# Patient Record
Sex: MALE | Race: WHITE | Employment: PART TIME | URBAN - METROPOLITAN AREA
[De-identification: names, ages, dates, MRNs, and addresses within clinical notes are randomized per-mention and may not be internally consistent; named-entity substitution may affect disease eponyms.]

---

## 2019-07-10 ENCOUNTER — HOSPITAL ENCOUNTER (OUTPATIENT)
Age: 54
Setting detail: SPECIMEN
Discharge: HOME | End: 2019-07-10
Payer: MEDICAID

## 2019-07-10 LAB
ALBUMIN SERPL-MCNC: 4.2 G/DL (ref 3.5–5.2)
ALBUMIN/GLOBULIN RATIO: 1.3 (ref 1–2.5)
ALP BLD-CCNC: 70 U/L (ref 40–129)
ALT SERPL-CCNC: 17 U/L (ref 5–41)
ANION GAP SERPL CALCULATED.3IONS-SCNC: 15 MMOL/L (ref 9–17)
AST SERPL-CCNC: 17 U/L
BILIRUB SERPL-MCNC: 0.34 MG/DL (ref 0.3–1.2)
BUN BLDV-MCNC: 12 MG/DL (ref 6–20)
BUN/CREAT BLD: NORMAL (ref 9–20)
CALCIUM SERPL-MCNC: 9.4 MG/DL (ref 8.6–10.4)
CHLORIDE BLD-SCNC: 102 MMOL/L (ref 98–107)
CHOLESTEROL, FASTING: 230 MG/DL
CHOLESTEROL/HDL RATIO: 3.4
CO2: 23 MMOL/L (ref 20–31)
CREAT SERPL-MCNC: 0.79 MG/DL (ref 0.7–1.2)
GFR AFRICAN AMERICAN: >60 ML/MIN
GFR NON-AFRICAN AMERICAN: >60 ML/MIN
GFR SERPL CREATININE-BSD FRML MDRD: NORMAL ML/MIN/{1.73_M2}
GFR SERPL CREATININE-BSD FRML MDRD: NORMAL ML/MIN/{1.73_M2}
GLUCOSE BLD-MCNC: 77 MG/DL (ref 70–99)
HAV IGM SER IA-ACNC: NONREACTIVE
HDLC SERPL-MCNC: 68 MG/DL
HEPATITIS B CORE IGM ANTIBODY: NONREACTIVE
HEPATITIS B SURFACE ANTIGEN: NONREACTIVE
HEPATITIS C ANTIBODY: NONREACTIVE
HIV AG/AB: NONREACTIVE
LDL CHOLESTEROL: 143 MG/DL (ref 0–130)
POTASSIUM SERPL-SCNC: 4.4 MMOL/L (ref 3.7–5.3)
PROSTATE SPECIFIC ANTIGEN: 3.14 UG/L
SODIUM BLD-SCNC: 140 MMOL/L (ref 135–144)
TESTOSTERONE TOTAL: 364 NG/DL (ref 220–1000)
TOTAL PROTEIN: 7.4 G/DL (ref 6.4–8.3)
TRIGLYCERIDE, FASTING: 96 MG/DL
VLDLC SERPL CALC-MCNC: ABNORMAL MG/DL (ref 1–30)

## 2019-07-11 LAB
THYROXINE, FREE: 1.07 NG/DL (ref 0.93–1.7)
TSH SERPL DL<=0.05 MIU/L-ACNC: 6.29 MIU/L (ref 0.3–5)

## 2019-07-12 LAB
CULTURE: ABNORMAL
CULTURE: ABNORMAL
DIRECT EXAM: ABNORMAL
Lab: ABNORMAL
SPECIMEN DESCRIPTION: ABNORMAL

## 2019-07-16 LAB
CULTURE: NORMAL
Lab: NORMAL
SPECIMEN DESCRIPTION: NORMAL

## 2020-01-01 ENCOUNTER — HOSPITAL ENCOUNTER (OUTPATIENT)
Age: 55
Setting detail: SPECIMEN
Discharge: HOME | End: 2020-08-31
Payer: MEDICAID

## 2020-01-01 ENCOUNTER — HOSPITAL ENCOUNTER (OUTPATIENT)
Age: 55
Setting detail: SPECIMEN
Discharge: HOME | End: 2020-11-24
Payer: MEDICAID

## 2020-01-01 LAB
ABSOLUTE EOS #: 0.24 K/UL (ref 0–0.44)
ABSOLUTE EOS #: 0.44 K/UL (ref 0–0.44)
ABSOLUTE IMMATURE GRANULOCYTE: 0.04 K/UL (ref 0–0.3)
ABSOLUTE IMMATURE GRANULOCYTE: <0.03 K/UL (ref 0–0.3)
ABSOLUTE LYMPH #: 1.17 K/UL (ref 1.1–3.7)
ABSOLUTE LYMPH #: 1.22 K/UL (ref 1.1–3.7)
ABSOLUTE MONO #: 0.61 K/UL (ref 0.1–1.2)
ABSOLUTE MONO #: 0.69 K/UL (ref 0.1–1.2)
ALBUMIN SERPL-MCNC: 4 G/DL (ref 3.5–5.2)
ALBUMIN SERPL-MCNC: 4.1 G/DL (ref 3.5–5.2)
ALBUMIN/GLOBULIN RATIO: 1.3 (ref 1–2.5)
ALBUMIN/GLOBULIN RATIO: 1.4 (ref 1–2.5)
ALP BLD-CCNC: 68 U/L (ref 40–129)
ALP BLD-CCNC: 68 U/L (ref 40–129)
ALT SERPL-CCNC: 16 U/L (ref 5–41)
ALT SERPL-CCNC: 25 U/L (ref 5–41)
ANION GAP SERPL CALCULATED.3IONS-SCNC: 11 MMOL/L (ref 9–17)
ANION GAP SERPL CALCULATED.3IONS-SCNC: 12 MMOL/L (ref 9–17)
AST SERPL-CCNC: 16 U/L
AST SERPL-CCNC: 20 U/L
BASOPHILS # BLD: 1 % (ref 0–2)
BASOPHILS # BLD: 1 % (ref 0–2)
BASOPHILS ABSOLUTE: 0.06 K/UL (ref 0–0.2)
BASOPHILS ABSOLUTE: 0.09 K/UL (ref 0–0.2)
BILIRUB SERPL-MCNC: 0.26 MG/DL (ref 0.3–1.2)
BILIRUB SERPL-MCNC: 0.28 MG/DL (ref 0.3–1.2)
BUN BLDV-MCNC: 12 MG/DL (ref 6–20)
BUN BLDV-MCNC: 14 MG/DL (ref 6–20)
BUN/CREAT BLD: ABNORMAL (ref 9–20)
BUN/CREAT BLD: ABNORMAL (ref 9–20)
CALCIUM SERPL-MCNC: 9.4 MG/DL (ref 8.6–10.4)
CALCIUM SERPL-MCNC: 9.5 MG/DL (ref 8.6–10.4)
CHLORIDE BLD-SCNC: 104 MMOL/L (ref 98–107)
CHLORIDE BLD-SCNC: 106 MMOL/L (ref 98–107)
CHOLESTEROL, FASTING: 247 MG/DL
CHOLESTEROL/HDL RATIO: 3.7
CO2: 23 MMOL/L (ref 20–31)
CO2: 24 MMOL/L (ref 20–31)
CREAT SERPL-MCNC: 0.95 MG/DL (ref 0.7–1.2)
CREAT SERPL-MCNC: 0.98 MG/DL (ref 0.7–1.2)
DIFFERENTIAL TYPE: ABNORMAL
DIFFERENTIAL TYPE: ABNORMAL
EOSINOPHILS RELATIVE PERCENT: 4 % (ref 1–4)
EOSINOPHILS RELATIVE PERCENT: 6 % (ref 1–4)
GFR AFRICAN AMERICAN: >60 ML/MIN
GFR AFRICAN AMERICAN: >60 ML/MIN
GFR NON-AFRICAN AMERICAN: >60 ML/MIN
GFR NON-AFRICAN AMERICAN: >60 ML/MIN
GFR SERPL CREATININE-BSD FRML MDRD: ABNORMAL ML/MIN/{1.73_M2}
GLUCOSE BLD-MCNC: 106 MG/DL (ref 70–99)
GLUCOSE BLD-MCNC: 96 MG/DL (ref 70–99)
HCT VFR BLD CALC: 47.3 % (ref 40.7–50.3)
HCT VFR BLD CALC: 50.8 % (ref 40.7–50.3)
HDLC SERPL-MCNC: 66 MG/DL
HEMOGLOBIN: 15.7 G/DL (ref 13–17)
HEMOGLOBIN: 16.6 G/DL (ref 13–17)
IMMATURE GRANULOCYTES: 0 %
IMMATURE GRANULOCYTES: 1 %
LDL CHOLESTEROL: 157 MG/DL (ref 0–130)
LYMPHOCYTES # BLD: 17 % (ref 24–43)
LYMPHOCYTES # BLD: 17 % (ref 24–43)
MCH RBC QN AUTO: 30.7 PG (ref 25.2–33.5)
MCH RBC QN AUTO: 31 PG (ref 25.2–33.5)
MCHC RBC AUTO-ENTMCNC: 32.7 G/DL (ref 28.4–34.8)
MCHC RBC AUTO-ENTMCNC: 33.2 G/DL (ref 28.4–34.8)
MCV RBC AUTO: 93.3 FL (ref 82.6–102.9)
MCV RBC AUTO: 93.9 FL (ref 82.6–102.9)
MONOCYTES # BLD: 10 % (ref 3–12)
MONOCYTES # BLD: 9 % (ref 3–12)
NRBC AUTOMATED: 0 PER 100 WBC
NRBC AUTOMATED: 0 PER 100 WBC
PDW BLD-RTO: 12.7 % (ref 11.8–14.4)
PDW BLD-RTO: 13.1 % (ref 11.8–14.4)
PLATELET # BLD: 339 K/UL (ref 138–453)
PLATELET # BLD: 339 K/UL (ref 138–453)
PLATELET ESTIMATE: ABNORMAL
PLATELET ESTIMATE: ABNORMAL
PMV BLD AUTO: 9.6 FL (ref 8.1–13.5)
PMV BLD AUTO: 9.7 FL (ref 8.1–13.5)
POTASSIUM SERPL-SCNC: 4.1 MMOL/L (ref 3.7–5.3)
POTASSIUM SERPL-SCNC: 4.2 MMOL/L (ref 3.7–5.3)
RBC # BLD: 5.07 M/UL (ref 4.21–5.77)
RBC # BLD: 5.41 M/UL (ref 4.21–5.77)
RBC # BLD: ABNORMAL 10*6/UL
RBC # BLD: ABNORMAL 10*6/UL
SEG NEUTROPHILS: 65 % (ref 36–65)
SEG NEUTROPHILS: 69 % (ref 36–65)
SEGMENTED NEUTROPHILS ABSOLUTE COUNT: 4.61 K/UL (ref 1.5–8.1)
SEGMENTED NEUTROPHILS ABSOLUTE COUNT: 4.7 K/UL (ref 1.5–8.1)
SODIUM BLD-SCNC: 138 MMOL/L (ref 135–144)
SODIUM BLD-SCNC: 142 MMOL/L (ref 135–144)
THYROXINE, FREE: 1.18 NG/DL (ref 0.93–1.7)
TOTAL PROTEIN: 7 G/DL (ref 6.4–8.3)
TOTAL PROTEIN: 7.1 G/DL (ref 6.4–8.3)
TRIGLYCERIDE, FASTING: 120 MG/DL
TSH SERPL DL<=0.05 MIU/L-ACNC: 5.67 MIU/L (ref 0.3–5)
VLDLC SERPL CALC-MCNC: ABNORMAL MG/DL (ref 1–30)
WBC # BLD: 6.8 K/UL (ref 3.5–11.3)
WBC # BLD: 7.1 K/UL (ref 3.5–11.3)
WBC # BLD: ABNORMAL 10*3/UL
WBC # BLD: ABNORMAL 10*3/UL

## 2020-02-17 ENCOUNTER — HOSPITAL ENCOUNTER (OUTPATIENT)
Age: 55
Setting detail: SPECIMEN
Discharge: HOME | End: 2020-02-17
Payer: MEDICAID

## 2020-02-17 LAB
ABSOLUTE EOS #: 0.39 K/UL (ref 0–0.44)
ABSOLUTE IMMATURE GRANULOCYTE: 0.04 K/UL (ref 0–0.3)
ABSOLUTE LYMPH #: 1.03 K/UL (ref 1.1–3.7)
ABSOLUTE MONO #: 0.72 K/UL (ref 0.1–1.2)
ALBUMIN SERPL-MCNC: 4 G/DL (ref 3.5–5.2)
ALBUMIN/GLOBULIN RATIO: 1.2 (ref 1–2.5)
ALP BLD-CCNC: 71 U/L (ref 40–129)
ALT SERPL-CCNC: 17 U/L (ref 5–41)
ANION GAP SERPL CALCULATED.3IONS-SCNC: 12 MMOL/L (ref 9–17)
AST SERPL-CCNC: 15 U/L
BASOPHILS # BLD: 1 % (ref 0–2)
BASOPHILS ABSOLUTE: 0.07 K/UL (ref 0–0.2)
BILIRUB SERPL-MCNC: 0.24 MG/DL (ref 0.3–1.2)
BUN BLDV-MCNC: 13 MG/DL (ref 6–20)
BUN/CREAT BLD: ABNORMAL (ref 9–20)
CALCIUM SERPL-MCNC: 9.5 MG/DL (ref 8.6–10.4)
CHLORIDE BLD-SCNC: 103 MMOL/L (ref 98–107)
CHOLESTEROL, FASTING: 239 MG/DL
CHOLESTEROL/HDL RATIO: 3.5
CO2: 25 MMOL/L (ref 20–31)
CREAT SERPL-MCNC: 0.82 MG/DL (ref 0.7–1.2)
DIFFERENTIAL TYPE: ABNORMAL
EOSINOPHILS RELATIVE PERCENT: 6 % (ref 1–4)
GFR AFRICAN AMERICAN: >60 ML/MIN
GFR NON-AFRICAN AMERICAN: >60 ML/MIN
GFR SERPL CREATININE-BSD FRML MDRD: ABNORMAL ML/MIN/{1.73_M2}
GFR SERPL CREATININE-BSD FRML MDRD: ABNORMAL ML/MIN/{1.73_M2}
GLUCOSE BLD-MCNC: 92 MG/DL (ref 70–99)
HCT VFR BLD CALC: 52.5 % (ref 40.7–50.3)
HDLC SERPL-MCNC: 69 MG/DL
HEMOGLOBIN: 16.7 G/DL (ref 13–17)
IMMATURE GRANULOCYTES: 1 %
LDL CHOLESTEROL: 138 MG/DL (ref 0–130)
LYMPHOCYTES # BLD: 15 % (ref 24–43)
MCH RBC QN AUTO: 30.8 PG (ref 25.2–33.5)
MCHC RBC AUTO-ENTMCNC: 31.8 G/DL (ref 28.4–34.8)
MCV RBC AUTO: 96.7 FL (ref 82.6–102.9)
MONOCYTES # BLD: 10 % (ref 3–12)
NRBC AUTOMATED: 0 PER 100 WBC
PDW BLD-RTO: 12.7 % (ref 11.8–14.4)
PLATELET # BLD: 361 K/UL (ref 138–453)
PLATELET ESTIMATE: ABNORMAL
PMV BLD AUTO: 9.9 FL (ref 8.1–13.5)
POTASSIUM SERPL-SCNC: 4.4 MMOL/L (ref 3.7–5.3)
RBC # BLD: 5.43 M/UL (ref 4.21–5.77)
RBC # BLD: ABNORMAL 10*6/UL
SEG NEUTROPHILS: 67 % (ref 36–65)
SEGMENTED NEUTROPHILS ABSOLUTE COUNT: 4.87 K/UL (ref 1.5–8.1)
SODIUM BLD-SCNC: 140 MMOL/L (ref 135–144)
TOTAL PROTEIN: 7.3 G/DL (ref 6.4–8.3)
TRIGLYCERIDE, FASTING: 159 MG/DL
TSH SERPL DL<=0.05 MIU/L-ACNC: 5.73 MIU/L (ref 0.3–5)
VLDLC SERPL CALC-MCNC: ABNORMAL MG/DL (ref 1–30)
WBC # BLD: 7.1 K/UL (ref 3.5–11.3)
WBC # BLD: ABNORMAL 10*3/UL

## 2020-02-18 LAB — THYROXINE, FREE: 0.96 NG/DL (ref 0.93–1.7)

## 2021-01-01 ENCOUNTER — APPOINTMENT (OUTPATIENT)
Dept: CT IMAGING | Age: 56
DRG: 136 | End: 2021-01-01
Payer: MEDICAID

## 2021-01-01 ENCOUNTER — HOSPITAL ENCOUNTER (OUTPATIENT)
Dept: CT IMAGING | Age: 56
Discharge: HOME | End: 2021-05-06

## 2021-01-01 ENCOUNTER — HOSPITAL ENCOUNTER (OUTPATIENT)
Dept: RADIATION ONCOLOGY | Age: 56
Discharge: HOME | End: 2021-06-01
Payer: MEDICAID

## 2021-01-01 ENCOUNTER — HOSPITAL ENCOUNTER (OUTPATIENT)
Age: 56
Setting detail: OUTPATIENT SURGERY
Discharge: HOME | End: 2021-01-22
Attending: INTERNAL MEDICINE | Admitting: INTERNAL MEDICINE
Payer: MEDICAID

## 2021-01-01 ENCOUNTER — HOSPITAL ENCOUNTER (OUTPATIENT)
Dept: CT IMAGING | Age: 56
Discharge: HOME | End: 2021-05-27

## 2021-01-01 ENCOUNTER — HOSPITAL ENCOUNTER (INPATIENT)
Age: 56
LOS: 1 days | DRG: 862 | End: 2021-06-02
Attending: HOSPITALIST | Admitting: HOSPITALIST
Payer: MEDICAID

## 2021-01-01 ENCOUNTER — HOSPITAL ENCOUNTER (INPATIENT)
Age: 56
LOS: 5 days | Discharge: HOSPICE | DRG: 136 | End: 2021-06-02
Attending: EMERGENCY MEDICINE | Admitting: FAMILY MEDICINE
Payer: MEDICAID

## 2021-01-01 ENCOUNTER — ANESTHESIA EVENT (OUTPATIENT)
Dept: ENDOSCOPY | Age: 56
End: 2021-01-01
Payer: MEDICAID

## 2021-01-01 ENCOUNTER — HOSPITAL ENCOUNTER (OUTPATIENT)
Age: 56
Setting detail: SPECIMEN
Discharge: HOME | End: 2021-04-30
Payer: MEDICAID

## 2021-01-01 ENCOUNTER — HOSPITAL ENCOUNTER (OUTPATIENT)
Age: 56
Discharge: HOME | End: 2021-03-26
Payer: MEDICAID

## 2021-01-01 ENCOUNTER — ANESTHESIA (OUTPATIENT)
Dept: ENDOSCOPY | Age: 56
End: 2021-01-01
Payer: MEDICAID

## 2021-01-01 ENCOUNTER — HOSPITAL ENCOUNTER (OUTPATIENT)
Age: 56
Setting detail: SPECIMEN
Discharge: HOME | End: 2021-01-15
Payer: MEDICAID

## 2021-01-01 ENCOUNTER — OFFICE VISIT (OUTPATIENT)
Dept: ONCOLOGY | Age: 56
End: 2021-01-01
Payer: MEDICAID

## 2021-01-01 ENCOUNTER — HOSPITAL ENCOUNTER (OUTPATIENT)
Dept: MRI IMAGING | Age: 56
Discharge: HOME | End: 2021-05-14
Payer: MEDICAID

## 2021-01-01 ENCOUNTER — APPOINTMENT (OUTPATIENT)
Dept: MRI IMAGING | Age: 56
DRG: 136 | End: 2021-01-01
Payer: MEDICAID

## 2021-01-01 ENCOUNTER — HOSPITAL ENCOUNTER (OUTPATIENT)
Dept: INFUSION THERAPY | Age: 56
Discharge: HOME | End: 2021-05-06
Payer: MEDICAID

## 2021-01-01 ENCOUNTER — HOSPITAL ENCOUNTER (OUTPATIENT)
Dept: GENERAL RADIOLOGY | Age: 56
Discharge: HOME | End: 2021-05-06

## 2021-01-01 ENCOUNTER — HOSPITAL ENCOUNTER (OUTPATIENT)
Dept: GENERAL RADIOLOGY | Age: 56
Discharge: HOME | End: 2021-05-27

## 2021-01-01 ENCOUNTER — HOSPITAL ENCOUNTER (OUTPATIENT)
Age: 56
Setting detail: OUTPATIENT SURGERY
Discharge: HOME | End: 2021-04-02
Attending: INTERNAL MEDICINE | Admitting: INTERNAL MEDICINE
Payer: MEDICAID

## 2021-01-01 ENCOUNTER — HOSPITAL ENCOUNTER (OUTPATIENT)
Age: 56
Discharge: HOME | End: 2021-05-21
Payer: MEDICAID

## 2021-01-01 ENCOUNTER — HOSPITAL ENCOUNTER (OUTPATIENT)
Age: 56
Setting detail: SPECIMEN
Discharge: HOME | End: 2021-05-20
Payer: MEDICAID

## 2021-01-01 VITALS
RESPIRATION RATE: 24 BRPM | SYSTOLIC BLOOD PRESSURE: 141 MMHG | DIASTOLIC BLOOD PRESSURE: 65 MMHG | TEMPERATURE: 97.4 F | BODY MASS INDEX: 29.87 KG/M2 | HEART RATE: 107 BPM | WEIGHT: 201.7 LBS | OXYGEN SATURATION: 92 % | HEIGHT: 69 IN

## 2021-01-01 VITALS
OXYGEN SATURATION: 95 % | BODY MASS INDEX: 28.88 KG/M2 | HEIGHT: 69 IN | RESPIRATION RATE: 16 BRPM | TEMPERATURE: 79 F | HEART RATE: 67 BPM | WEIGHT: 195 LBS | SYSTOLIC BLOOD PRESSURE: 146 MMHG | DIASTOLIC BLOOD PRESSURE: 69 MMHG

## 2021-01-01 VITALS
DIASTOLIC BLOOD PRESSURE: 54 MMHG | OXYGEN SATURATION: 83 % | SYSTOLIC BLOOD PRESSURE: 96 MMHG | RESPIRATION RATE: 26 BRPM | TEMPERATURE: 98.3 F | HEART RATE: 98 BPM

## 2021-01-01 VITALS
SYSTOLIC BLOOD PRESSURE: 118 MMHG | RESPIRATION RATE: 16 BRPM | OXYGEN SATURATION: 98 % | BODY MASS INDEX: 29.44 KG/M2 | HEIGHT: 69 IN | HEART RATE: 77 BPM | WEIGHT: 198.8 LBS | DIASTOLIC BLOOD PRESSURE: 76 MMHG | TEMPERATURE: 97.7 F

## 2021-01-01 VITALS
RESPIRATION RATE: 22 BRPM | OXYGEN SATURATION: 93 % | DIASTOLIC BLOOD PRESSURE: 63 MMHG | SYSTOLIC BLOOD PRESSURE: 161 MMHG

## 2021-01-01 VITALS
DIASTOLIC BLOOD PRESSURE: 55 MMHG | SYSTOLIC BLOOD PRESSURE: 95 MMHG | TEMPERATURE: 96.8 F | OXYGEN SATURATION: 96 % | RESPIRATION RATE: 22 BRPM

## 2021-01-01 VITALS
WEIGHT: 197.8 LBS | RESPIRATION RATE: 19 BRPM | BODY MASS INDEX: 29.3 KG/M2 | DIASTOLIC BLOOD PRESSURE: 66 MMHG | HEART RATE: 78 BPM | SYSTOLIC BLOOD PRESSURE: 129 MMHG | HEIGHT: 69 IN | OXYGEN SATURATION: 96 %

## 2021-01-01 DIAGNOSIS — R17 ELEVATED BILIRUBIN: Primary | ICD-10-CM

## 2021-01-01 DIAGNOSIS — D72.829 LEUKOCYTOSIS, UNSPECIFIED TYPE: ICD-10-CM

## 2021-01-01 DIAGNOSIS — Z00.6 ENCOUNTER FOR EXAMINATION FOR NORMAL COMPARISON AND CONTROL IN CLINICAL RESEARCH PROGRAM: ICD-10-CM

## 2021-01-01 DIAGNOSIS — Z00.6 EXAMINATION FOR NORMAL COMPARISON FOR CLINICAL RESEARCH: ICD-10-CM

## 2021-01-01 DIAGNOSIS — R91.1 LUNG NODULE: ICD-10-CM

## 2021-01-01 DIAGNOSIS — C78.7 LIVER METASTASES (HCC): Primary | ICD-10-CM

## 2021-01-01 DIAGNOSIS — R93.3 ABNORMAL CT SCAN, STOMACH: ICD-10-CM

## 2021-01-01 DIAGNOSIS — C78.7 LIVER METASTASES (HCC): ICD-10-CM

## 2021-01-01 LAB
ABSOLUTE EOS #: 0.34 K/UL (ref 0–0.44)
ABSOLUTE IMMATURE GRANULOCYTE: 0.2 K/UL (ref 0–0.3)
ABSOLUTE LYMPH #: 0.93 K/UL (ref 1.1–3.7)
ABSOLUTE MONO #: 1.13 K/UL (ref 0.1–1.2)
ALBUMIN SERPL-MCNC: 3.4 G/DL (ref 3.5–5.1)
ALBUMIN SERPL-MCNC: 3.4 G/DL (ref 3.5–5.1)
ALBUMIN SERPL-MCNC: 3.5 G/DL (ref 3.5–5.1)
ALBUMIN SERPL-MCNC: 3.8 G/DL (ref 3.5–5.1)
ALBUMIN SERPL-MCNC: 4.5 G/DL (ref 3.5–5.2)
ALBUMIN/GLOBULIN RATIO: 1.3 (ref 1–2.5)
ALP BLD-CCNC: 177 U/L (ref 40–129)
ALP BLD-CCNC: 743 U/L (ref 38–126)
ALP BLD-CCNC: 760 U/L (ref 38–126)
ALP BLD-CCNC: 771 U/L (ref 38–126)
ALP BLD-CCNC: 793 U/L (ref 38–126)
ALT SERPL-CCNC: 22 U/L (ref 5–41)
ALT SERPL-CCNC: 47 U/L (ref 11–66)
ALT SERPL-CCNC: 50 U/L (ref 11–66)
ALT SERPL-CCNC: 52 U/L (ref 11–66)
ALT SERPL-CCNC: 65 U/L (ref 11–66)
ANION GAP SERPL CALCULATED.3IONS-SCNC: 10 MEQ/L (ref 8–16)
ANION GAP SERPL CALCULATED.3IONS-SCNC: 11 MEQ/L (ref 8–16)
ANION GAP SERPL CALCULATED.3IONS-SCNC: 11 MEQ/L (ref 8–16)
ANION GAP SERPL CALCULATED.3IONS-SCNC: 12 MEQ/L (ref 8–16)
ANION GAP SERPL CALCULATED.3IONS-SCNC: 14 MEQ/L (ref 8–16)
ANION GAP SERPL CALCULATED.3IONS-SCNC: 15 MEQ/L (ref 8–16)
ANION GAP SERPL CALCULATED.3IONS-SCNC: 17 MMOL/L (ref 9–17)
ANISOCYTOSIS: PRESENT
APTT: 23.7 SECONDS (ref 22–38)
AST SERPL-CCNC: 127 U/L (ref 5–40)
AST SERPL-CCNC: 135 U/L (ref 5–40)
AST SERPL-CCNC: 144 U/L (ref 5–40)
AST SERPL-CCNC: 154 U/L (ref 5–40)
AST SERPL-CCNC: 40 U/L
BASOPHILIA: ABNORMAL
BASOPHILIA: ABNORMAL
BASOPHILS # BLD: 0 %
BASOPHILS # BLD: 0.1 %
BASOPHILS # BLD: 0.8 %
BASOPHILS # BLD: 1 % (ref 0–2)
BASOPHILS ABSOLUTE: 0 THOU/MM3 (ref 0–0.1)
BASOPHILS ABSOLUTE: 0 THOU/MM3 (ref 0–0.1)
BASOPHILS ABSOLUTE: 0.12 K/UL (ref 0–0.2)
BASOPHILS ABSOLUTE: 0.2 THOU/MM3 (ref 0–0.1)
BILIRUB SERPL-MCNC: 0.46 MG/DL (ref 0.3–1.2)
BILIRUB SERPL-MCNC: 2.1 MG/DL (ref 0.3–1.2)
BILIRUB SERPL-MCNC: 2.2 MG/DL (ref 0.3–1.2)
BILIRUB SERPL-MCNC: 2.3 MG/DL (ref 0.3–1.2)
BILIRUB SERPL-MCNC: 2.7 MG/DL (ref 0.3–1.2)
BILIRUBIN DIRECT: 1.6 MG/DL (ref 0–0.3)
BILIRUBIN DIRECT: 1.8 MG/DL (ref 0–0.3)
BILIRUBIN DIRECT: 2.3 MG/DL (ref 0–0.3)
BUN BLDV-MCNC: 16 MG/DL (ref 6–20)
BUN BLDV-MCNC: 34 MG/DL (ref 7–22)
BUN BLDV-MCNC: 36 MG/DL (ref 7–22)
BUN BLDV-MCNC: 37 MG/DL (ref 7–22)
BUN BLDV-MCNC: 40 MG/DL (ref 7–22)
BUN BLDV-MCNC: 41 MG/DL (ref 7–22)
BUN BLDV-MCNC: 45 MG/DL (ref 7–22)
BUN/CREAT BLD: ABNORMAL (ref 9–20)
CA 19-9: 7 U/ML (ref 0–35)
CALCIUM IONIZED: 1.36 MMOL/L (ref 1.12–1.32)
CALCIUM IONIZED: 1.38 MMOL/L (ref 1.12–1.32)
CALCIUM IONIZED: 1.39 MMOL/L (ref 1.12–1.32)
CALCIUM SERPL-MCNC: 10.5 MG/DL (ref 8.5–10.5)
CALCIUM SERPL-MCNC: 10.6 MG/DL (ref 8.5–10.5)
CALCIUM SERPL-MCNC: 10.6 MG/DL (ref 8.5–10.5)
CALCIUM SERPL-MCNC: 10.7 MG/DL (ref 8.5–10.5)
CALCIUM SERPL-MCNC: 10.7 MG/DL (ref 8.5–10.5)
CALCIUM SERPL-MCNC: 10.8 MG/DL (ref 8.5–10.5)
CALCIUM SERPL-MCNC: 11 MG/DL (ref 8.5–10.5)
CALCIUM SERPL-MCNC: 11.8 MG/DL (ref 8.5–10.5)
CALCIUM SERPL-MCNC: 9.8 MG/DL (ref 8.6–10.4)
CEA: 6.9 NG/ML (ref 0–5)
CHLORIDE BLD-SCNC: 101 MEQ/L (ref 98–111)
CHLORIDE BLD-SCNC: 101 MEQ/L (ref 98–111)
CHLORIDE BLD-SCNC: 101 MMOL/L (ref 98–107)
CHLORIDE BLD-SCNC: 105 MEQ/L (ref 98–111)
CHLORIDE BLD-SCNC: 109 MEQ/L (ref 98–111)
CHLORIDE BLD-SCNC: 112 MEQ/L (ref 98–111)
CHLORIDE BLD-SCNC: 99 MEQ/L (ref 98–111)
CHOLESTEROL, FASTING: 254 MG/DL
CHOLESTEROL/HDL RATIO: 5.9
CO2: 20 MEQ/L (ref 23–33)
CO2: 20 MMOL/L (ref 20–31)
CO2: 21 MEQ/L (ref 23–33)
CO2: 22 MEQ/L (ref 23–33)
CO2: 22 MEQ/L (ref 23–33)
CO2: 23 MEQ/L (ref 23–33)
CO2: 23 MEQ/L (ref 23–33)
CO2: 24 MEQ/L (ref 23–33)
CO2: 25 MEQ/L (ref 23–33)
CREAT SERPL-MCNC: 0.93 MG/DL (ref 0.7–1.2)
CREAT SERPL-MCNC: 1 MG/DL (ref 0.4–1.2)
CREAT SERPL-MCNC: 1 MG/DL (ref 0.4–1.2)
CREAT SERPL-MCNC: 1.1 MG/DL (ref 0.4–1.2)
CREAT SERPL-MCNC: 1.2 MG/DL (ref 0.4–1.2)
CREAT SERPL-MCNC: 1.3 MG/DL (ref 0.4–1.2)
CREAT SERPL-MCNC: 1.3 MG/DL (ref 0.4–1.2)
CULTURE: NO GROWTH
DIFFERENTIAL TYPE: ABNORMAL
DIFFERENTIAL, MANUAL: NORMAL
EKG ATRIAL RATE: 80 BPM
EKG P AXIS: 68 DEGREES
EKG P-R INTERVAL: 136 MS
EKG Q-T INTERVAL: 386 MS
EKG QRS DURATION: 90 MS
EKG QTC CALCULATION (BAZETT): 445 MS
EKG R AXIS: 73 DEGREES
EKG T AXIS: 69 DEGREES
EKG VENTRICULAR RATE: 80 BPM
EOSINOPHIL # BLD: 0.9 %
EOSINOPHIL # BLD: 1 %
EOSINOPHIL # BLD: 1.1 %
EOSINOPHILS ABSOLUTE: 0.2 THOU/MM3 (ref 0–0.4)
EOSINOPHILS ABSOLUTE: 0.3 THOU/MM3 (ref 0–0.4)
EOSINOPHILS ABSOLUTE: 0.3 THOU/MM3 (ref 0–0.4)
EOSINOPHILS RELATIVE PERCENT: 3 % (ref 1–4)
ERYTHROCYTE [DISTWIDTH] IN BLOOD BY AUTOMATED COUNT: 15.2 % (ref 11.5–14.5)
ERYTHROCYTE [DISTWIDTH] IN BLOOD BY AUTOMATED COUNT: 15.5 % (ref 11.5–14.5)
ERYTHROCYTE [DISTWIDTH] IN BLOOD BY AUTOMATED COUNT: 15.7 % (ref 11.5–14.5)
ERYTHROCYTE [DISTWIDTH] IN BLOOD BY AUTOMATED COUNT: 16 % (ref 11.5–14.5)
ERYTHROCYTE [DISTWIDTH] IN BLOOD BY AUTOMATED COUNT: 16.5 % (ref 11.5–14.5)
ERYTHROCYTE [DISTWIDTH] IN BLOOD BY AUTOMATED COUNT: 16.9 % (ref 11.5–14.5)
ERYTHROCYTE [DISTWIDTH] IN BLOOD BY AUTOMATED COUNT: 51.4 FL (ref 35–45)
ERYTHROCYTE [DISTWIDTH] IN BLOOD BY AUTOMATED COUNT: 52.2 FL (ref 35–45)
ERYTHROCYTE [DISTWIDTH] IN BLOOD BY AUTOMATED COUNT: 53.5 FL (ref 35–45)
ERYTHROCYTE [DISTWIDTH] IN BLOOD BY AUTOMATED COUNT: 56.1 FL (ref 35–45)
ERYTHROCYTE [DISTWIDTH] IN BLOOD BY AUTOMATED COUNT: 58.1 FL (ref 35–45)
ERYTHROCYTE [DISTWIDTH] IN BLOOD BY AUTOMATED COUNT: 59.7 FL (ref 35–45)
FOLATE: 3.4 NG/ML (ref 4.8–24.2)
GFR AFRICAN AMERICAN: >60 ML/MIN
GFR NON-AFRICAN AMERICAN: >60 ML/MIN
GFR SERPL CREATININE-BSD FRML MDRD: 57 ML/MIN/1.73M2
GFR SERPL CREATININE-BSD FRML MDRD: 57 ML/MIN/1.73M2
GFR SERPL CREATININE-BSD FRML MDRD: 63 ML/MIN/1.73M2
GFR SERPL CREATININE-BSD FRML MDRD: 70 ML/MIN/1.73M2
GFR SERPL CREATININE-BSD FRML MDRD: 78 ML/MIN/1.73M2
GFR SERPL CREATININE-BSD FRML MDRD: 78 ML/MIN/1.73M2
GFR SERPL CREATININE-BSD FRML MDRD: ABNORMAL ML/MIN/{1.73_M2}
GFR SERPL CREATININE-BSD FRML MDRD: ABNORMAL ML/MIN/{1.73_M2}
GLUCOSE BLD-MCNC: 100 MG/DL (ref 70–108)
GLUCOSE BLD-MCNC: 100 MG/DL (ref 70–108)
GLUCOSE BLD-MCNC: 103 MG/DL (ref 70–108)
GLUCOSE BLD-MCNC: 105 MG/DL (ref 70–108)
GLUCOSE BLD-MCNC: 105 MG/DL (ref 70–99)
GLUCOSE BLD-MCNC: 106 MG/DL (ref 70–108)
GLUCOSE BLD-MCNC: 116 MG/DL (ref 70–108)
GLUCOSE BLD-MCNC: 129 MG/DL (ref 70–108)
GLUCOSE BLD-MCNC: 134 MG/DL (ref 70–108)
HCT VFR BLD CALC: 30.6 % (ref 42–52)
HCT VFR BLD CALC: 31.5 % (ref 42–52)
HCT VFR BLD CALC: 31.8 % (ref 42–52)
HCT VFR BLD CALC: 32.4 % (ref 42–52)
HCT VFR BLD CALC: 34.7 % (ref 42–52)
HCT VFR BLD CALC: 37.9 % (ref 42–52)
HCT VFR BLD CALC: 47.4 % (ref 40.7–50.3)
HDLC SERPL-MCNC: 43 MG/DL
HEMOGLOBIN: 10.5 GM/DL (ref 14–18)
HEMOGLOBIN: 12 GM/DL (ref 14–18)
HEMOGLOBIN: 15.3 G/DL (ref 13–17)
HEMOGLOBIN: 8.9 GM/DL (ref 14–18)
HEMOGLOBIN: 9.3 GM/DL (ref 14–18)
HEMOGLOBIN: 9.4 GM/DL (ref 14–18)
HEMOGLOBIN: 9.8 GM/DL (ref 14–18)
IMMATURE GRANS (ABS): 2.38 THOU/MM3 (ref 0–0.07)
IMMATURE GRANS (ABS): 2.53 THOU/MM3 (ref 0–0.07)
IMMATURE GRANULOCYTES: 10.5 %
IMMATURE GRANULOCYTES: 2 %
IMMATURE GRANULOCYTES: 9.9 %
INFLUENZA A: NOT DETECTED
INFLUENZA B: NOT DETECTED
INR BLD: 1.21 (ref 0.85–1.13)
LACTIC ACID, SEPSIS: 1.4 MMOL/L (ref 0.5–1.9)
LDL CHOLESTEROL: 182 MG/DL (ref 0–130)
LIPASE: 90.6 U/L (ref 5.6–51.3)
LV EF: 58 %
LVEF MODALITY: NORMAL
LYMPHOCYTES # BLD: 7 %
LYMPHOCYTES # BLD: 9 % (ref 24–43)
LYMPHOCYTES # BLD: 9.7 %
LYMPHOCYTES # BLD: 9.9 %
LYMPHOCYTES ABSOLUTE: 1.8 THOU/MM3 (ref 1–4.8)
LYMPHOCYTES ABSOLUTE: 2.3 THOU/MM3 (ref 1–4.8)
LYMPHOCYTES ABSOLUTE: 2.4 THOU/MM3 (ref 1–4.8)
Lab: NORMAL
MAGNESIUM: 3 MG/DL (ref 1.6–2.4)
MCH RBC QN AUTO: 28.8 PG (ref 26–33)
MCH RBC QN AUTO: 29.1 PG (ref 26–33)
MCH RBC QN AUTO: 29.1 PG (ref 26–33)
MCH RBC QN AUTO: 29.2 PG (ref 26–33)
MCH RBC QN AUTO: 29.6 PG (ref 26–33)
MCH RBC QN AUTO: 29.6 PG (ref 26–33)
MCH RBC QN AUTO: 29.9 PG (ref 25.2–33.5)
MCHC RBC AUTO-ENTMCNC: 29.1 GM/DL (ref 32.2–35.5)
MCHC RBC AUTO-ENTMCNC: 29.2 GM/DL (ref 32.2–35.5)
MCHC RBC AUTO-ENTMCNC: 29.8 GM/DL (ref 32.2–35.5)
MCHC RBC AUTO-ENTMCNC: 30.2 GM/DL (ref 32.2–35.5)
MCHC RBC AUTO-ENTMCNC: 30.3 GM/DL (ref 32.2–35.5)
MCHC RBC AUTO-ENTMCNC: 31.7 GM/DL (ref 32.2–35.5)
MCHC RBC AUTO-ENTMCNC: 32.3 G/DL (ref 28.4–34.8)
MCV RBC AUTO: 100.3 FL (ref 80–94)
MCV RBC AUTO: 92.6 FL (ref 82.6–102.9)
MCV RBC AUTO: 93.6 FL (ref 80–94)
MCV RBC AUTO: 95.3 FL (ref 80–94)
MCV RBC AUTO: 96.1 FL (ref 80–94)
MCV RBC AUTO: 99.1 FL (ref 80–94)
MCV RBC AUTO: 99.4 FL (ref 80–94)
METAMYELOCYTES: 7 %
MONOCYTES # BLD: 11 % (ref 3–12)
MONOCYTES # BLD: 6 %
MONOCYTES # BLD: 6.1 %
MONOCYTES # BLD: 6.4 %
MONOCYTES ABSOLUTE: 1.5 THOU/MM3 (ref 0.4–1.3)
MYELOCYTES: 3 %
NRBC AUTOMATED: 0 PER 100 WBC
NUCLEATED RED BLOOD CELLS: 0 /100 WBC
NUCLEATED RED BLOOD CELLS: 0 /100 WBC
NUCLEATED RED BLOOD CELLS: 1 /100 WBC
OSMOLALITY CALCULATION: 285.5 MOSMOL/KG (ref 275–300)
PATHOLOGIST REVIEW: ABNORMAL
PDW BLD-RTO: 12.6 % (ref 11.8–14.4)
PLATELET # BLD: 136 THOU/MM3 (ref 130–400)
PLATELET # BLD: 136 THOU/MM3 (ref 130–400)
PLATELET # BLD: 139 THOU/MM3 (ref 130–400)
PLATELET # BLD: 147 THOU/MM3 (ref 130–400)
PLATELET # BLD: 152 THOU/MM3 (ref 130–400)
PLATELET # BLD: 179 THOU/MM3 (ref 130–400)
PLATELET # BLD: ABNORMAL K/UL (ref 138–453)
PLATELET ESTIMATE: ABNORMAL
PLATELET ESTIMATE: ADEQUATE
PLATELET, FLUORESCENCE: NORMAL K/UL (ref 138–453)
PMV BLD AUTO: 10 FL (ref 9.4–12.4)
PMV BLD AUTO: 10.1 FL (ref 9.4–12.4)
PMV BLD AUTO: 10.8 FL (ref 9.4–12.4)
PMV BLD AUTO: 10.8 FL (ref 9.4–12.4)
PMV BLD AUTO: 10.9 FL (ref 9.4–12.4)
PMV BLD AUTO: 9.7 FL (ref 9.4–12.4)
PMV BLD AUTO: ABNORMAL FL (ref 8.1–13.5)
POIKILOCYTES: ABNORMAL
POTASSIUM REFLEX MAGNESIUM: 4.7 MEQ/L (ref 3.5–5.2)
POTASSIUM SERPL-SCNC: 3.9 MEQ/L (ref 3.5–5.2)
POTASSIUM SERPL-SCNC: 4 MEQ/L (ref 3.5–5.2)
POTASSIUM SERPL-SCNC: 4.2 MEQ/L (ref 3.5–5.2)
POTASSIUM SERPL-SCNC: 4.4 MEQ/L (ref 3.5–5.2)
POTASSIUM SERPL-SCNC: 4.4 MEQ/L (ref 3.5–5.2)
POTASSIUM SERPL-SCNC: 4.5 MEQ/L (ref 3.5–5.2)
POTASSIUM SERPL-SCNC: 4.6 MEQ/L (ref 3.5–5.2)
POTASSIUM SERPL-SCNC: 5 MMOL/L (ref 3.7–5.3)
PRO-BNP: 2406 PG/ML (ref 0–900)
PTH INTACT: 8.2 PG/ML (ref 15–65)
RBC # BLD: 3.05 MILL/MM3 (ref 4.7–6.1)
RBC # BLD: 3.18 MILL/MM3 (ref 4.7–6.1)
RBC # BLD: 3.2 MILL/MM3 (ref 4.7–6.1)
RBC # BLD: 3.37 MILL/MM3 (ref 4.7–6.1)
RBC # BLD: 3.64 MILL/MM3 (ref 4.7–6.1)
RBC # BLD: 4.05 MILL/MM3 (ref 4.7–6.1)
RBC # BLD: 5.12 M/UL (ref 4.21–5.77)
RBC # BLD: ABNORMAL 10*6/UL
SARS-COV-2 RNA, RT PCR: NOT DETECTED
SARS-COV-2, NAAT: NOT DETECTED
SARS-COV-2: NOT DETECTED
SARS-COV-2: NOT DETECTED
SCAN OF BLOOD SMEAR: NORMAL
SCAN OF BLOOD SMEAR: NORMAL
SEG NEUTROPHILS: 71.8 %
SEG NEUTROPHILS: 72.8 %
SEG NEUTROPHILS: 73 % (ref 36–65)
SEG NEUTROPHILS: 76 %
SEGMENTED NEUTROPHILS ABSOLUTE COUNT: 17.2 THOU/MM3 (ref 1.8–7.7)
SEGMENTED NEUTROPHILS ABSOLUTE COUNT: 17.5 THOU/MM3 (ref 1.8–7.7)
SEGMENTED NEUTROPHILS ABSOLUTE COUNT: 19.4 THOU/MM3 (ref 1.8–7.7)
SEGMENTED NEUTROPHILS ABSOLUTE COUNT: 7.33 K/UL (ref 1.5–8.1)
SODIUM BLD-SCNC: 135 MEQ/L (ref 135–145)
SODIUM BLD-SCNC: 137 MEQ/L (ref 135–145)
SODIUM BLD-SCNC: 138 MEQ/L (ref 135–145)
SODIUM BLD-SCNC: 138 MMOL/L (ref 135–144)
SODIUM BLD-SCNC: 140 MEQ/L (ref 135–145)
SODIUM BLD-SCNC: 142 MEQ/L (ref 135–145)
SODIUM BLD-SCNC: 146 MEQ/L (ref 135–145)
SOURCE: NORMAL
SPECIMEN DESCRIPTION: NORMAL
STOMATOCYTES: ABNORMAL
STOMATOCYTES: ABNORMAL
TOTAL PROTEIN: 6 G/DL (ref 6.1–8)
TOTAL PROTEIN: 6.1 G/DL (ref 6.1–8)
TOTAL PROTEIN: 6.1 G/DL (ref 6.1–8)
TOTAL PROTEIN: 7 G/DL (ref 6.1–8)
TOTAL PROTEIN: 8 G/DL (ref 6.4–8.3)
TRIGLYCERIDE, FASTING: 146 MG/DL
TROPONIN T: < 0.01 NG/ML
TSH SERPL DL<=0.05 MIU/L-ACNC: 1.84 MIU/L (ref 0.3–5)
VITAMIN B-12: 775 PG/ML (ref 211–911)
VLDLC SERPL CALC-MCNC: ABNORMAL MG/DL (ref 1–30)
WBC # BLD: 10.1 K/UL (ref 3.5–11.3)
WBC # BLD: 23.7 THOU/MM3 (ref 4.8–10.8)
WBC # BLD: 24 THOU/MM3 (ref 4.8–10.8)
WBC # BLD: 24 THOU/MM3 (ref 4.8–10.8)
WBC # BLD: 25.5 THOU/MM3 (ref 4.8–10.8)
WBC # BLD: 26.5 THOU/MM3 (ref 4.8–10.8)
WBC # BLD: 28.6 THOU/MM3 (ref 4.8–10.8)
WBC # BLD: ABNORMAL 10*3/UL

## 2021-01-01 PROCEDURE — 6360000002 HC RX W HCPCS: Performed by: HOSPITALIST

## 2021-01-01 PROCEDURE — 87635 SARS-COV-2 COVID-19 AMP PRB: CPT

## 2021-01-01 PROCEDURE — 1200000003 HC TELEMETRY R&B

## 2021-01-01 PROCEDURE — 80048 BASIC METABOLIC PNL TOTAL CA: CPT

## 2021-01-01 PROCEDURE — 6360000004 HC RX CONTRAST MEDICATION: Performed by: PHYSICIAN ASSISTANT

## 2021-01-01 PROCEDURE — 6370000000 HC RX 637 (ALT 250 FOR IP): Performed by: FAMILY MEDICINE

## 2021-01-01 PROCEDURE — 2580000003 HC RX 258: Performed by: STUDENT IN AN ORGANIZED HEALTH CARE EDUCATION/TRAINING PROGRAM

## 2021-01-01 PROCEDURE — 99253 IP/OBS CNSLTJ NEW/EST LOW 45: CPT | Performed by: NURSE PRACTITIONER

## 2021-01-01 PROCEDURE — 71275 CT ANGIOGRAPHY CHEST: CPT

## 2021-01-01 PROCEDURE — 3609012400 HC EGD TRANSORAL BIOPSY SINGLE/MULTIPLE: Performed by: INTERNAL MEDICINE

## 2021-01-01 PROCEDURE — 3700000000 HC ANESTHESIA ATTENDED CARE: Performed by: INTERNAL MEDICINE

## 2021-01-01 PROCEDURE — G8419 CALC BMI OUT NRM PARAM NOF/U: HCPCS | Performed by: INTERNAL MEDICINE

## 2021-01-01 PROCEDURE — 6360000002 HC RX W HCPCS: Performed by: FAMILY MEDICINE

## 2021-01-01 PROCEDURE — 3017F COLORECTAL CA SCREEN DOC REV: CPT | Performed by: INTERNAL MEDICINE

## 2021-01-01 PROCEDURE — 82607 VITAMIN B-12: CPT

## 2021-01-01 PROCEDURE — 99233 SBSQ HOSP IP/OBS HIGH 50: CPT | Performed by: NURSE PRACTITIONER

## 2021-01-01 PROCEDURE — 85027 COMPLETE CBC AUTOMATED: CPT

## 2021-01-01 PROCEDURE — U0005 INFEC AGEN DETEC AMPLI PROBE: HCPCS

## 2021-01-01 PROCEDURE — 80053 COMPREHEN METABOLIC PANEL: CPT

## 2021-01-01 PROCEDURE — 2709999900 HC NON-CHARGEABLE SUPPLY: Performed by: INTERNAL MEDICINE

## 2021-01-01 PROCEDURE — 84484 ASSAY OF TROPONIN QUANT: CPT

## 2021-01-01 PROCEDURE — 70553 MRI BRAIN STEM W/O & W/DYE: CPT

## 2021-01-01 PROCEDURE — 99232 SBSQ HOSP IP/OBS MODERATE 35: CPT | Performed by: FAMILY MEDICINE

## 2021-01-01 PROCEDURE — 6370000000 HC RX 637 (ALT 250 FOR IP): Performed by: NURSE PRACTITIONER

## 2021-01-01 PROCEDURE — 82248 BILIRUBIN DIRECT: CPT

## 2021-01-01 PROCEDURE — 2500000003 HC RX 250 WO HCPCS: Performed by: HOSPITALIST

## 2021-01-01 PROCEDURE — A9579 GAD-BASE MR CONTRAST NOS,1ML: HCPCS | Performed by: NURSE PRACTITIONER

## 2021-01-01 PROCEDURE — 6370000000 HC RX 637 (ALT 250 FOR IP): Performed by: STUDENT IN AN ORGANIZED HEALTH CARE EDUCATION/TRAINING PROGRAM

## 2021-01-01 PROCEDURE — 6360000002 HC RX W HCPCS: Performed by: STUDENT IN AN ORGANIZED HEALTH CARE EDUCATION/TRAINING PROGRAM

## 2021-01-01 PROCEDURE — 86301 IMMUNOASSAY TUMOR CA 19-9: CPT

## 2021-01-01 PROCEDURE — 36415 COLL VENOUS BLD VENIPUNCTURE: CPT

## 2021-01-01 PROCEDURE — 99232 SBSQ HOSP IP/OBS MODERATE 35: CPT | Performed by: INTERNAL MEDICINE

## 2021-01-01 PROCEDURE — 2580000003 HC RX 258: Performed by: INTERNAL MEDICINE

## 2021-01-01 PROCEDURE — 83519 RIA NONANTIBODY: CPT

## 2021-01-01 PROCEDURE — 6370000000 HC RX 637 (ALT 250 FOR IP): Performed by: REGISTERED NURSE

## 2021-01-01 PROCEDURE — 1200000000 HC SEMI PRIVATE

## 2021-01-01 PROCEDURE — 6360000002 HC RX W HCPCS

## 2021-01-01 PROCEDURE — 88305 TISSUE EXAM BY PATHOLOGIST: CPT

## 2021-01-01 PROCEDURE — 83880 ASSAY OF NATRIURETIC PEPTIDE: CPT

## 2021-01-01 PROCEDURE — 3700000001 HC ADD 15 MINUTES (ANESTHESIA): Performed by: INTERNAL MEDICINE

## 2021-01-01 PROCEDURE — 83690 ASSAY OF LIPASE: CPT

## 2021-01-01 PROCEDURE — 70450 CT HEAD/BRAIN W/O DYE: CPT

## 2021-01-01 PROCEDURE — 83970 ASSAY OF PARATHORMONE: CPT

## 2021-01-01 PROCEDURE — 94640 AIRWAY INHALATION TREATMENT: CPT

## 2021-01-01 PROCEDURE — 82330 ASSAY OF CALCIUM: CPT

## 2021-01-01 PROCEDURE — 7100000011 HC PHASE II RECOVERY - ADDTL 15 MIN: Performed by: INTERNAL MEDICINE

## 2021-01-01 PROCEDURE — 7100000010 HC PHASE II RECOVERY - FIRST 15 MIN: Performed by: INTERNAL MEDICINE

## 2021-01-01 PROCEDURE — 6360000002 HC RX W HCPCS: Performed by: REGISTERED NURSE

## 2021-01-01 PROCEDURE — 82378 CARCINOEMBRYONIC ANTIGEN: CPT

## 2021-01-01 PROCEDURE — 6360000002 HC RX W HCPCS: Performed by: PHYSICIAN ASSISTANT

## 2021-01-01 PROCEDURE — 94760 N-INVAS EAR/PLS OXIMETRY 1: CPT

## 2021-01-01 PROCEDURE — 83605 ASSAY OF LACTIC ACID: CPT

## 2021-01-01 PROCEDURE — 99233 SBSQ HOSP IP/OBS HIGH 50: CPT | Performed by: FAMILY MEDICINE

## 2021-01-01 PROCEDURE — 99233 SBSQ HOSP IP/OBS HIGH 50: CPT | Performed by: HOSPITALIST

## 2021-01-01 PROCEDURE — U0003 INFECTIOUS AGENT DETECTION BY NUCLEIC ACID (DNA OR RNA); SEVERE ACUTE RESPIRATORY SYNDROME CORONAVIRUS 2 (SARS-COV-2) (CORONAVIRUS DISEASE [COVID-19]), AMPLIFIED PROBE TECHNIQUE, MAKING USE OF HIGH THROUGHPUT TECHNOLOGIES AS DESCRIBED BY CMS-2020-01-R: HCPCS

## 2021-01-01 PROCEDURE — 74183 MRI ABD W/O CNTR FLWD CNTR: CPT

## 2021-01-01 PROCEDURE — 85025 COMPLETE CBC W/AUTO DIFF WBC: CPT

## 2021-01-01 PROCEDURE — 2700000000 HC OXYGEN THERAPY PER DAY

## 2021-01-01 PROCEDURE — 83735 ASSAY OF MAGNESIUM: CPT

## 2021-01-01 PROCEDURE — 4004F PT TOBACCO SCREEN RCVD TLK: CPT | Performed by: INTERNAL MEDICINE

## 2021-01-01 PROCEDURE — 93306 TTE W/DOPPLER COMPLETE: CPT

## 2021-01-01 PROCEDURE — 93005 ELECTROCARDIOGRAM TRACING: CPT | Performed by: PHYSICIAN ASSISTANT

## 2021-01-01 PROCEDURE — 6360000002 HC RX W HCPCS: Performed by: NURSE PRACTITIONER

## 2021-01-01 PROCEDURE — 2720000010 HC SURG SUPPLY STERILE: Performed by: INTERNAL MEDICINE

## 2021-01-01 PROCEDURE — 85610 PROTHROMBIN TIME: CPT

## 2021-01-01 PROCEDURE — 99284 EMERGENCY DEPT VISIT MOD MDM: CPT

## 2021-01-01 PROCEDURE — 96374 THER/PROPH/DIAG INJ IV PUSH: CPT

## 2021-01-01 PROCEDURE — 99211 OFF/OP EST MAY X REQ PHY/QHP: CPT

## 2021-01-01 PROCEDURE — 96375 TX/PRO/DX INJ NEW DRUG ADDON: CPT

## 2021-01-01 PROCEDURE — APPSS30 APP SPLIT SHARED TIME 16-30 MINUTES: Performed by: NURSE PRACTITIONER

## 2021-01-01 PROCEDURE — 6360000004 HC RX CONTRAST MEDICATION: Performed by: NURSE PRACTITIONER

## 2021-01-01 PROCEDURE — 6360000004 HC RX CONTRAST MEDICATION: Performed by: STUDENT IN AN ORGANIZED HEALTH CARE EDUCATION/TRAINING PROGRAM

## 2021-01-01 PROCEDURE — 87636 SARSCOV2 & INF A&B AMP PRB: CPT

## 2021-01-01 PROCEDURE — 92610 EVALUATE SWALLOWING FUNCTION: CPT

## 2021-01-01 PROCEDURE — A9579 GAD-BASE MR CONTRAST NOS,1ML: HCPCS | Performed by: STUDENT IN AN ORGANIZED HEALTH CARE EDUCATION/TRAINING PROGRAM

## 2021-01-01 PROCEDURE — 99202 OFFICE O/P NEW SF 15 MIN: CPT | Performed by: RADIOLOGY

## 2021-01-01 PROCEDURE — 2500000003 HC RX 250 WO HCPCS: Performed by: NURSE ANESTHETIST, CERTIFIED REGISTERED

## 2021-01-01 PROCEDURE — 2580000003 HC RX 258: Performed by: HOSPITALIST

## 2021-01-01 PROCEDURE — G8427 DOCREV CUR MEDS BY ELIG CLIN: HCPCS | Performed by: INTERNAL MEDICINE

## 2021-01-01 PROCEDURE — 3609010600 HC COLONOSCOPY POLYPECTOMY SNARE/COLD BIOPSY: Performed by: INTERNAL MEDICINE

## 2021-01-01 PROCEDURE — 6360000002 HC RX W HCPCS: Performed by: NURSE ANESTHETIST, CERTIFIED REGISTERED

## 2021-01-01 PROCEDURE — 99204 OFFICE O/P NEW MOD 45 MIN: CPT | Performed by: INTERNAL MEDICINE

## 2021-01-01 PROCEDURE — 99222 1ST HOSP IP/OBS MODERATE 55: CPT | Performed by: FAMILY MEDICINE

## 2021-01-01 PROCEDURE — 99254 IP/OBS CNSLTJ NEW/EST MOD 60: CPT | Performed by: INTERNAL MEDICINE

## 2021-01-01 PROCEDURE — 74177 CT ABD & PELVIS W/CONTRAST: CPT

## 2021-01-01 PROCEDURE — 99254 IP/OBS CNSLTJ NEW/EST MOD 60: CPT | Performed by: NURSE PRACTITIONER

## 2021-01-01 PROCEDURE — 3609010300 HC COLONOSCOPY W/BIOPSY SINGLE/MULTIPLE: Performed by: INTERNAL MEDICINE

## 2021-01-01 PROCEDURE — 82746 ASSAY OF FOLIC ACID SERUM: CPT

## 2021-01-01 PROCEDURE — 85730 THROMBOPLASTIN TIME PARTIAL: CPT

## 2021-01-01 RX ORDER — MORPHINE SULFATE 4 MG/ML
4 INJECTION, SOLUTION INTRAMUSCULAR; INTRAVENOUS EVERY 4 HOURS PRN
Status: DISCONTINUED | OUTPATIENT
Start: 2021-01-01 | End: 2021-01-01

## 2021-01-01 RX ORDER — OXYCODONE HYDROCHLORIDE 5 MG/1
5 TABLET ORAL EVERY 4 HOURS PRN
Status: DISCONTINUED | OUTPATIENT
Start: 2021-01-01 | End: 2021-01-01 | Stop reason: HOSPADM

## 2021-01-01 RX ORDER — FENTANYL CITRATE 50 UG/ML
50 INJECTION, SOLUTION INTRAMUSCULAR; INTRAVENOUS ONCE
Status: CANCELLED | OUTPATIENT
Start: 2021-01-01 | End: 2021-01-01

## 2021-01-01 RX ORDER — GLYCOPYRROLATE 0.2 MG/ML
0.2 INJECTION INTRAMUSCULAR; INTRAVENOUS
Status: DISCONTINUED | OUTPATIENT
Start: 2021-01-01 | End: 2021-01-01 | Stop reason: SDUPTHER

## 2021-01-01 RX ORDER — FOLIC ACID 1 MG/1
1 TABLET ORAL DAILY
Status: DISCONTINUED | OUTPATIENT
Start: 2021-01-01 | End: 2021-01-01 | Stop reason: HOSPADM

## 2021-01-01 RX ORDER — MORPHINE SULFATE 15 MG/1
15 TABLET, FILM COATED, EXTENDED RELEASE ORAL EVERY 12 HOURS SCHEDULED
Status: DISCONTINUED | OUTPATIENT
Start: 2021-01-01 | End: 2021-01-01 | Stop reason: HOSPADM

## 2021-01-01 RX ORDER — 0.9 % SODIUM CHLORIDE 0.9 %
500 INTRAVENOUS SOLUTION INTRAVENOUS ONCE
Status: COMPLETED | OUTPATIENT
Start: 2021-01-01 | End: 2021-01-01

## 2021-01-01 RX ORDER — DOCUSATE SODIUM 100 MG/1
100 CAPSULE, LIQUID FILLED ORAL 2 TIMES DAILY
Status: DISCONTINUED | OUTPATIENT
Start: 2021-01-01 | End: 2021-01-01 | Stop reason: HOSPADM

## 2021-01-01 RX ORDER — LORAZEPAM 2 MG/ML
1 INJECTION INTRAMUSCULAR
Status: CANCELLED | OUTPATIENT
Start: 2021-01-01

## 2021-01-01 RX ORDER — LEVETIRACETAM 500 MG/1
500 TABLET ORAL 2 TIMES DAILY
Status: DISCONTINUED | OUTPATIENT
Start: 2021-01-01 | End: 2021-01-01 | Stop reason: HOSPADM

## 2021-01-01 RX ORDER — MORPHINE SULFATE/0.9% NACL/PF 1 MG/ML
SYRINGE (ML) INJECTION CONTINUOUS
Status: DISCONTINUED | OUTPATIENT
Start: 2021-01-01 | End: 2021-01-01 | Stop reason: HOSPADM

## 2021-01-01 RX ORDER — MIDAZOLAM HYDROCHLORIDE 1 MG/ML
1 INJECTION INTRAMUSCULAR; INTRAVENOUS ONCE
Status: CANCELLED | OUTPATIENT
Start: 2021-01-01 | End: 2021-01-01

## 2021-01-01 RX ORDER — SODIUM CHLORIDE 9 MG/ML
25 INJECTION, SOLUTION INTRAVENOUS PRN
Status: DISCONTINUED | OUTPATIENT
Start: 2021-01-01 | End: 2021-01-01 | Stop reason: HOSPADM

## 2021-01-01 RX ORDER — PROMETHAZINE HYDROCHLORIDE 25 MG/1
12.5 TABLET ORAL EVERY 6 HOURS PRN
Status: DISCONTINUED | OUTPATIENT
Start: 2021-01-01 | End: 2021-01-01 | Stop reason: HOSPADM

## 2021-01-01 RX ORDER — ONDANSETRON 2 MG/ML
4 INJECTION INTRAMUSCULAR; INTRAVENOUS ONCE
Status: COMPLETED | OUTPATIENT
Start: 2021-01-01 | End: 2021-01-01

## 2021-01-01 RX ORDER — ONDANSETRON 2 MG/ML
4 INJECTION INTRAMUSCULAR; INTRAVENOUS EVERY 6 HOURS PRN
Status: DISCONTINUED | OUTPATIENT
Start: 2021-01-01 | End: 2021-01-01 | Stop reason: HOSPADM

## 2021-01-01 RX ORDER — FENTANYL CITRATE 50 UG/ML
50 INJECTION, SOLUTION INTRAMUSCULAR; INTRAVENOUS ONCE
Status: COMPLETED | OUTPATIENT
Start: 2021-01-01 | End: 2021-01-01

## 2021-01-01 RX ORDER — MORPHINE SULFATE 4 MG/ML
4 INJECTION, SOLUTION INTRAMUSCULAR; INTRAVENOUS
Status: DISCONTINUED | OUTPATIENT
Start: 2021-01-01 | End: 2021-01-01 | Stop reason: HOSPADM

## 2021-01-01 RX ORDER — SODIUM CHLORIDE 9 MG/ML
INJECTION, SOLUTION INTRAVENOUS CONTINUOUS
Status: DISCONTINUED | OUTPATIENT
Start: 2021-01-01 | End: 2021-06-03 | Stop reason: HOSPADM

## 2021-01-01 RX ORDER — DEXAMETHASONE 4 MG/1
4 TABLET ORAL EVERY 8 HOURS SCHEDULED
Status: DISPENSED | OUTPATIENT
Start: 2021-01-01 | End: 2021-01-01

## 2021-01-01 RX ORDER — LEVOTHYROXINE SODIUM 0.12 MG/1
125 TABLET ORAL DAILY
COMMUNITY

## 2021-01-01 RX ORDER — HYDROCODONE BITARTRATE AND ACETAMINOPHEN 5; 325 MG/1; MG/1
1 TABLET ORAL EVERY 4 HOURS PRN
Status: DISCONTINUED | OUTPATIENT
Start: 2021-01-01 | End: 2021-01-01

## 2021-01-01 RX ORDER — GLYCOPYRROLATE 1 MG/5 ML
0.2 SYRINGE (ML) INTRAVENOUS
Status: DISCONTINUED | OUTPATIENT
Start: 2021-01-01 | End: 2021-06-03 | Stop reason: HOSPADM

## 2021-01-01 RX ORDER — ACETAMINOPHEN 325 MG/1
650 TABLET ORAL EVERY 6 HOURS PRN
Status: DISCONTINUED | OUTPATIENT
Start: 2021-01-01 | End: 2021-01-01 | Stop reason: HOSPADM

## 2021-01-01 RX ORDER — SODIUM CHLORIDE 450 MG/100ML
INJECTION, SOLUTION INTRAVENOUS CONTINUOUS
Status: DISCONTINUED | OUTPATIENT
Start: 2021-01-01 | End: 2021-01-01 | Stop reason: HOSPADM

## 2021-01-01 RX ORDER — SENNA PLUS 8.6 MG/1
2 TABLET ORAL NIGHTLY
Status: DISCONTINUED | OUTPATIENT
Start: 2021-01-01 | End: 2021-01-01 | Stop reason: HOSPADM

## 2021-01-01 RX ORDER — ONDANSETRON 2 MG/ML
INJECTION INTRAMUSCULAR; INTRAVENOUS
Status: COMPLETED
Start: 2021-01-01 | End: 2021-01-01

## 2021-01-01 RX ORDER — LORAZEPAM 2 MG/ML
0.5 INJECTION INTRAMUSCULAR EVERY 4 HOURS PRN
Status: DISCONTINUED | OUTPATIENT
Start: 2021-01-01 | End: 2021-01-01

## 2021-01-01 RX ORDER — OXYCODONE HYDROCHLORIDE 5 MG/1
5 TABLET ORAL EVERY 4 HOURS PRN
Status: DISCONTINUED | OUTPATIENT
Start: 2021-01-01 | End: 2021-01-01

## 2021-01-01 RX ORDER — MORPHINE SULFATE 4 MG/ML
INJECTION, SOLUTION INTRAMUSCULAR; INTRAVENOUS
Status: COMPLETED
Start: 2021-01-01 | End: 2021-01-01

## 2021-01-01 RX ORDER — ACETAMINOPHEN 650 MG/1
650 SUPPOSITORY RECTAL EVERY 4 HOURS PRN
Status: CANCELLED | OUTPATIENT
Start: 2021-01-01

## 2021-01-01 RX ORDER — MORPHINE SULFATE/0.9% NACL/PF 1 MG/ML
SYRINGE (ML) INJECTION CONTINUOUS
Status: CANCELLED | OUTPATIENT
Start: 2021-01-01

## 2021-01-01 RX ORDER — MORPHINE SULFATE 2 MG/ML
2 INJECTION, SOLUTION INTRAMUSCULAR; INTRAVENOUS EVERY 4 HOURS PRN
Status: DISCONTINUED | OUTPATIENT
Start: 2021-01-01 | End: 2021-01-01

## 2021-01-01 RX ORDER — LORAZEPAM 2 MG/ML
1 INJECTION INTRAMUSCULAR
Status: DISCONTINUED | OUTPATIENT
Start: 2021-01-01 | End: 2021-01-01 | Stop reason: HOSPADM

## 2021-01-01 RX ORDER — PROPOFOL 10 MG/ML
INJECTION, EMULSION INTRAVENOUS PRN
Status: DISCONTINUED | OUTPATIENT
Start: 2021-01-01 | End: 2021-01-01 | Stop reason: SDUPTHER

## 2021-01-01 RX ORDER — OXYCODONE HYDROCHLORIDE 5 MG/1
5 TABLET ORAL EVERY 4 HOURS PRN
Status: DISCONTINUED | OUTPATIENT
Start: 2021-01-01 | End: 2021-01-01 | Stop reason: SDUPTHER

## 2021-01-01 RX ORDER — PANTOPRAZOLE SODIUM 40 MG/1
40 TABLET, DELAYED RELEASE ORAL
Status: DISCONTINUED | OUTPATIENT
Start: 2021-01-01 | End: 2021-01-01 | Stop reason: HOSPADM

## 2021-01-01 RX ORDER — MORPHINE SULFATE 2 MG/ML
2 INJECTION, SOLUTION INTRAMUSCULAR; INTRAVENOUS
Status: DISCONTINUED | OUTPATIENT
Start: 2021-01-01 | End: 2021-01-01

## 2021-01-01 RX ORDER — MORPHINE SULFATE 4 MG/ML
4 INJECTION, SOLUTION INTRAMUSCULAR; INTRAVENOUS
Status: COMPLETED | OUTPATIENT
Start: 2021-01-01 | End: 2021-01-01

## 2021-01-01 RX ORDER — LIDOCAINE HYDROCHLORIDE 10 MG/ML
INJECTION, SOLUTION INFILTRATION; PERINEURAL PRN
Status: DISCONTINUED | OUTPATIENT
Start: 2021-01-01 | End: 2021-01-01 | Stop reason: SDUPTHER

## 2021-01-01 RX ORDER — ALBUTEROL SULFATE 2.5 MG/3ML
2.5 SOLUTION RESPIRATORY (INHALATION) EVERY 6 HOURS PRN
Status: DISCONTINUED | OUTPATIENT
Start: 2021-01-01 | End: 2021-01-01 | Stop reason: HOSPADM

## 2021-01-01 RX ORDER — LORAZEPAM 2 MG/ML
1 INJECTION INTRAMUSCULAR
Status: DISCONTINUED | OUTPATIENT
Start: 2021-01-01 | End: 2021-06-03 | Stop reason: HOSPADM

## 2021-01-01 RX ORDER — SODIUM CHLORIDE 450 MG/100ML
INJECTION, SOLUTION INTRAVENOUS CONTINUOUS
Status: CANCELLED | OUTPATIENT
Start: 2021-01-01

## 2021-01-01 RX ORDER — SODIUM CHLORIDE 9 MG/ML
INJECTION, SOLUTION INTRAVENOUS CONTINUOUS
Status: CANCELLED | OUTPATIENT
Start: 2021-01-01

## 2021-01-01 RX ORDER — ACETAMINOPHEN 650 MG/1
650 SUPPOSITORY RECTAL EVERY 4 HOURS PRN
Status: DISCONTINUED | OUTPATIENT
Start: 2021-01-01 | End: 2021-06-03 | Stop reason: HOSPADM

## 2021-01-01 RX ORDER — ALBUTEROL SULFATE 0.63 MG/3ML
1 SOLUTION RESPIRATORY (INHALATION) EVERY 6 HOURS PRN
COMMUNITY

## 2021-01-01 RX ORDER — SODIUM CHLORIDE 0.9 % (FLUSH) 0.9 %
5-40 SYRINGE (ML) INJECTION PRN
Status: DISCONTINUED | OUTPATIENT
Start: 2021-01-01 | End: 2021-01-01 | Stop reason: HOSPADM

## 2021-01-01 RX ORDER — SODIUM CHLORIDE 0.9 % (FLUSH) 0.9 %
10 SYRINGE (ML) INJECTION EVERY 12 HOURS SCHEDULED
Status: DISCONTINUED | OUTPATIENT
Start: 2021-01-01 | End: 2021-01-01 | Stop reason: HOSPADM

## 2021-01-01 RX ORDER — POLYETHYLENE GLYCOL 3350 17 G/17G
17 POWDER, FOR SOLUTION ORAL DAILY PRN
Status: DISCONTINUED | OUTPATIENT
Start: 2021-01-01 | End: 2021-01-01 | Stop reason: HOSPADM

## 2021-01-01 RX ORDER — GLYCOPYRROLATE 0.2 MG/ML
0.2 INJECTION INTRAMUSCULAR; INTRAVENOUS
Status: CANCELLED | OUTPATIENT
Start: 2021-01-01

## 2021-01-01 RX ORDER — OXYCODONE HYDROCHLORIDE 5 MG/1
10 TABLET ORAL EVERY 4 HOURS PRN
Status: DISCONTINUED | OUTPATIENT
Start: 2021-01-01 | End: 2021-01-01 | Stop reason: HOSPADM

## 2021-01-01 RX ORDER — ATORVASTATIN CALCIUM 10 MG/1
10 TABLET, FILM COATED ORAL DAILY
Status: DISCONTINUED | OUTPATIENT
Start: 2021-01-01 | End: 2021-01-01 | Stop reason: HOSPADM

## 2021-01-01 RX ORDER — OMEPRAZOLE 40 MG/1
40 CAPSULE, DELAYED RELEASE ORAL DAILY
Qty: 30 CAPSULE | Refills: 11 | Status: SHIPPED | OUTPATIENT
Start: 2021-01-01

## 2021-01-01 RX ORDER — OMEPRAZOLE 20 MG/1
20 CAPSULE, DELAYED RELEASE ORAL DAILY
Status: ON HOLD | COMMUNITY
End: 2021-01-01 | Stop reason: SDUPTHER

## 2021-01-01 RX ORDER — MORPHINE SULFATE 4 MG/ML
4 INJECTION, SOLUTION INTRAMUSCULAR; INTRAVENOUS ONCE
Status: COMPLETED | OUTPATIENT
Start: 2021-01-01 | End: 2021-01-01

## 2021-01-01 RX ORDER — HYDROCODONE BITARTRATE AND ACETAMINOPHEN 5; 325 MG/1; MG/1
1 TABLET ORAL EVERY 6 HOURS PRN
Status: DISCONTINUED | OUTPATIENT
Start: 2021-01-01 | End: 2021-01-01

## 2021-01-01 RX ORDER — LIDOCAINE 4 G/G
3 PATCH TOPICAL DAILY
Status: DISCONTINUED | OUTPATIENT
Start: 2021-01-01 | End: 2021-01-01 | Stop reason: HOSPADM

## 2021-01-01 RX ORDER — ACETAMINOPHEN 650 MG/1
650 SUPPOSITORY RECTAL EVERY 6 HOURS PRN
Status: DISCONTINUED | OUTPATIENT
Start: 2021-01-01 | End: 2021-01-01 | Stop reason: HOSPADM

## 2021-01-01 RX ORDER — LOVASTATIN 10 MG/1
10 TABLET ORAL NIGHTLY
COMMUNITY

## 2021-01-01 RX ORDER — LIDOCAINE HYDROCHLORIDE 20 MG/ML
JELLY TOPICAL PRN
Status: DISCONTINUED | OUTPATIENT
Start: 2021-01-01 | End: 2021-01-01 | Stop reason: SDUPTHER

## 2021-01-01 RX ORDER — MORPHINE SULFATE/0.9% NACL/PF 1 MG/ML
SYRINGE (ML) INJECTION CONTINUOUS
Status: DISCONTINUED | OUTPATIENT
Start: 2021-01-01 | End: 2021-06-03 | Stop reason: HOSPADM

## 2021-01-01 RX ORDER — SODIUM CHLORIDE 0.9 % (FLUSH) 0.9 %
5-40 SYRINGE (ML) INJECTION EVERY 12 HOURS SCHEDULED
Status: DISCONTINUED | OUTPATIENT
Start: 2021-01-01 | End: 2021-01-01 | Stop reason: HOSPADM

## 2021-01-01 RX ORDER — LEVOTHYROXINE SODIUM 0.12 MG/1
125 TABLET ORAL DAILY
Status: DISCONTINUED | OUTPATIENT
Start: 2021-01-01 | End: 2021-01-01 | Stop reason: HOSPADM

## 2021-01-01 RX ORDER — SODIUM CHLORIDE 0.9 % (FLUSH) 0.9 %
10 SYRINGE (ML) INJECTION PRN
Status: DISCONTINUED | OUTPATIENT
Start: 2021-01-01 | End: 2021-01-01 | Stop reason: HOSPADM

## 2021-01-01 RX ORDER — SODIUM CHLORIDE 9 MG/ML
INJECTION, SOLUTION INTRAVENOUS CONTINUOUS
Status: DISCONTINUED | OUTPATIENT
Start: 2021-01-01 | End: 2021-01-01 | Stop reason: HOSPADM

## 2021-01-01 RX ADMIN — ATORVASTATIN CALCIUM 10 MG: 10 TABLET, FILM COATED ORAL at 20:59

## 2021-01-01 RX ADMIN — MORPHINE SULFATE 15 MG: 15 TABLET, FILM COATED, EXTENDED RELEASE ORAL at 08:32

## 2021-01-01 RX ADMIN — PANTOPRAZOLE SODIUM 40 MG: 40 TABLET, DELAYED RELEASE ORAL at 06:10

## 2021-01-01 RX ADMIN — MORPHINE SULFATE 2 MG: 2 INJECTION, SOLUTION INTRAMUSCULAR; INTRAVENOUS at 03:27

## 2021-01-01 RX ADMIN — DEXAMETHASONE 4 MG: 4 TABLET ORAL at 04:04

## 2021-01-01 RX ADMIN — DOCUSATE SODIUM 100 MG: 100 CAPSULE, LIQUID FILLED ORAL at 08:26

## 2021-01-01 RX ADMIN — IOPAMIDOL 80 ML: 755 INJECTION, SOLUTION INTRAVENOUS at 14:55

## 2021-01-01 RX ADMIN — PROPOFOL 20 MG: 10 INJECTION, EMULSION INTRAVENOUS at 14:31

## 2021-01-01 RX ADMIN — DOCUSATE SODIUM 100 MG: 100 CAPSULE, LIQUID FILLED ORAL at 20:58

## 2021-01-01 RX ADMIN — MORPHINE SULFATE 4 MG: 4 INJECTION, SOLUTION INTRAMUSCULAR; INTRAVENOUS at 07:52

## 2021-01-01 RX ADMIN — LORAZEPAM 0.5 MG: 2 INJECTION INTRAMUSCULAR; INTRAVENOUS at 03:57

## 2021-01-01 RX ADMIN — MORPHINE SULFATE 2 MG: 2 INJECTION, SOLUTION INTRAMUSCULAR; INTRAVENOUS at 15:03

## 2021-01-01 RX ADMIN — LIDOCAINE HYDROCHLORIDE 80 MG: 10 INJECTION, SOLUTION INFILTRATION; PERINEURAL at 13:42

## 2021-01-01 RX ADMIN — HYDROCODONE BITARTRATE AND ACETAMINOPHEN 1 TABLET: 5; 325 TABLET ORAL at 11:11

## 2021-01-01 RX ADMIN — DOCUSATE SODIUM 100 MG: 100 CAPSULE, LIQUID FILLED ORAL at 21:14

## 2021-01-01 RX ADMIN — OXYCODONE 10 MG: 5 TABLET ORAL at 16:59

## 2021-01-01 RX ADMIN — MORPHINE SULFATE 4 MG: 4 INJECTION, SOLUTION INTRAMUSCULAR; INTRAVENOUS at 13:32

## 2021-01-01 RX ADMIN — OXYCODONE 10 MG: 5 TABLET ORAL at 22:55

## 2021-01-01 RX ADMIN — FENTANYL CITRATE 50 MCG: 50 INJECTION, SOLUTION INTRAMUSCULAR; INTRAVENOUS at 15:41

## 2021-01-01 RX ADMIN — SODIUM CHLORIDE: 9 INJECTION, SOLUTION INTRAVENOUS at 15:52

## 2021-01-01 RX ADMIN — MORPHINE SULFATE 15 MG: 15 TABLET, FILM COATED, EXTENDED RELEASE ORAL at 21:23

## 2021-01-01 RX ADMIN — DEXAMETHASONE 4 MG: 4 TABLET ORAL at 21:21

## 2021-01-01 RX ADMIN — SODIUM CHLORIDE: 9 INJECTION, SOLUTION INTRAVENOUS at 14:51

## 2021-01-01 RX ADMIN — SODIUM CHLORIDE: 4.5 INJECTION, SOLUTION INTRAVENOUS at 13:33

## 2021-01-01 RX ADMIN — METOPROLOL TARTRATE 25 MG: 25 TABLET, FILM COATED ORAL at 08:46

## 2021-01-01 RX ADMIN — MORPHINE SULFATE 4 MG: 4 INJECTION, SOLUTION INTRAMUSCULAR; INTRAVENOUS at 05:59

## 2021-01-01 RX ADMIN — MORPHINE SULFATE 2 MG: 2 INJECTION, SOLUTION INTRAMUSCULAR; INTRAVENOUS at 19:32

## 2021-01-01 RX ADMIN — MORPHINE SULFATE 15 MG: 15 TABLET, FILM COATED, EXTENDED RELEASE ORAL at 21:14

## 2021-01-01 RX ADMIN — Medication 0.2 MG: at 19:46

## 2021-01-01 RX ADMIN — HYDROCODONE BITARTRATE AND ACETAMINOPHEN 1 TABLET: 5; 325 TABLET ORAL at 03:47

## 2021-01-01 RX ADMIN — HYDROCODONE BITARTRATE AND ACETAMINOPHEN 1 TABLET: 5; 325 TABLET ORAL at 21:40

## 2021-01-01 RX ADMIN — MORPHINE SULFATE 4 MG: 4 INJECTION, SOLUTION INTRAMUSCULAR; INTRAVENOUS at 17:52

## 2021-01-01 RX ADMIN — SODIUM CHLORIDE: 9 INJECTION, SOLUTION INTRAVENOUS at 07:40

## 2021-01-01 RX ADMIN — METOPROLOL TARTRATE 25 MG: 25 TABLET, FILM COATED ORAL at 21:23

## 2021-01-01 RX ADMIN — PROPOFOL 40 MG: 10 INJECTION, EMULSION INTRAVENOUS at 14:33

## 2021-01-01 RX ADMIN — LIDOCAINE HYDROCHLORIDE 100 MG: 20 JELLY TOPICAL at 14:30

## 2021-01-01 RX ADMIN — SODIUM CHLORIDE: 9 INJECTION, SOLUTION INTRAVENOUS at 00:34

## 2021-01-01 RX ADMIN — METOPROLOL TARTRATE 25 MG: 25 TABLET, FILM COATED ORAL at 21:21

## 2021-01-01 RX ADMIN — GADOTERIDOL 15 ML: 279.3 INJECTION, SOLUTION INTRAVENOUS at 17:57

## 2021-01-01 RX ADMIN — SODIUM CHLORIDE, PRESERVATIVE FREE 10 ML: 5 INJECTION INTRAVENOUS at 20:57

## 2021-01-01 RX ADMIN — PROPOFOL 500 MG: 10 INJECTION, EMULSION INTRAVENOUS at 13:42

## 2021-01-01 RX ADMIN — MORPHINE SULFATE 4 MG: 4 INJECTION, SOLUTION INTRAMUSCULAR; INTRAVENOUS at 13:26

## 2021-01-01 RX ADMIN — LORAZEPAM 0.5 MG: 2 INJECTION INTRAMUSCULAR; INTRAVENOUS at 21:08

## 2021-01-01 RX ADMIN — METOPROLOL TARTRATE 25 MG: 25 TABLET, FILM COATED ORAL at 20:54

## 2021-01-01 RX ADMIN — OXYCODONE 10 MG: 5 TABLET ORAL at 11:13

## 2021-01-01 RX ADMIN — METOPROLOL TARTRATE 25 MG: 25 TABLET, FILM COATED ORAL at 08:32

## 2021-01-01 RX ADMIN — MORPHINE SULFATE 4 MG: 4 INJECTION, SOLUTION INTRAMUSCULAR; INTRAVENOUS at 14:51

## 2021-01-01 RX ADMIN — MORPHINE SULFATE 4 MG: 4 INJECTION, SOLUTION INTRAMUSCULAR; INTRAVENOUS at 21:30

## 2021-01-01 RX ADMIN — PROPOFOL 40 MG: 10 INJECTION, EMULSION INTRAVENOUS at 14:34

## 2021-01-01 RX ADMIN — HYDROCODONE BITARTRATE AND ACETAMINOPHEN 1 TABLET: 5; 325 TABLET ORAL at 05:02

## 2021-01-01 RX ADMIN — HYDROCODONE BITARTRATE AND ACETAMINOPHEN 1 TABLET: 5; 325 TABLET ORAL at 19:44

## 2021-01-01 RX ADMIN — MORPHINE SULFATE 15 MG: 15 TABLET, FILM COATED, EXTENDED RELEASE ORAL at 08:26

## 2021-01-01 RX ADMIN — LORAZEPAM 1 MG: 2 INJECTION INTRAMUSCULAR; INTRAVENOUS at 12:57

## 2021-01-01 RX ADMIN — Medication 0.2 MG: at 17:03

## 2021-01-01 RX ADMIN — GADOTERIDOL 20 ML: 279.3 INJECTION, SOLUTION INTRAVENOUS at 13:44

## 2021-01-01 RX ADMIN — SODIUM CHLORIDE: 9 INJECTION, SOLUTION INTRAVENOUS at 10:11

## 2021-01-01 RX ADMIN — ALBUTEROL SULFATE 2.5 MG: 2.5 SOLUTION RESPIRATORY (INHALATION) at 15:42

## 2021-01-01 RX ADMIN — LEVOTHYROXINE SODIUM 125 MCG: 0.12 TABLET ORAL at 06:10

## 2021-01-01 RX ADMIN — PANTOPRAZOLE SODIUM 40 MG: 40 TABLET, DELAYED RELEASE ORAL at 05:56

## 2021-01-01 RX ADMIN — LEVETIRACETAM 500 MG: 500 TABLET, FILM COATED ORAL at 21:21

## 2021-01-01 RX ADMIN — SODIUM CHLORIDE: 9 INJECTION, SOLUTION INTRAVENOUS at 06:16

## 2021-01-01 RX ADMIN — MORPHINE SULFATE 30 MG: 1 INJECTION INTRAVENOUS at 13:40

## 2021-01-01 RX ADMIN — MORPHINE SULFATE 2 MG: 2 INJECTION, SOLUTION INTRAMUSCULAR; INTRAVENOUS at 18:13

## 2021-01-01 RX ADMIN — DEXAMETHASONE 4 MG: 4 TABLET ORAL at 20:54

## 2021-01-01 RX ADMIN — ATORVASTATIN CALCIUM 10 MG: 10 TABLET, FILM COATED ORAL at 21:23

## 2021-01-01 RX ADMIN — ONDANSETRON 4 MG: 2 INJECTION INTRAMUSCULAR; INTRAVENOUS at 13:27

## 2021-01-01 RX ADMIN — DEXAMETHASONE 4 MG: 4 TABLET ORAL at 16:20

## 2021-01-01 RX ADMIN — PANTOPRAZOLE SODIUM 40 MG: 40 TABLET, DELAYED RELEASE ORAL at 04:06

## 2021-01-01 RX ADMIN — MORPHINE SULFATE 4 MG: 4 INJECTION, SOLUTION INTRAMUSCULAR; INTRAVENOUS at 01:59

## 2021-01-01 RX ADMIN — ALBUTEROL SULFATE 2.5 MG: 2.5 SOLUTION RESPIRATORY (INHALATION) at 23:26

## 2021-01-01 RX ADMIN — LEVETIRACETAM 500 MG: 500 TABLET, FILM COATED ORAL at 20:54

## 2021-01-01 RX ADMIN — SODIUM CHLORIDE 500 ML: 9 INJECTION, SOLUTION INTRAVENOUS at 18:17

## 2021-01-01 RX ADMIN — LEVOTHYROXINE SODIUM 125 MCG: 0.12 TABLET ORAL at 05:56

## 2021-01-01 RX ADMIN — MORPHINE SULFATE 15 MG: 15 TABLET, FILM COATED, EXTENDED RELEASE ORAL at 20:54

## 2021-01-01 RX ADMIN — LORAZEPAM 1 MG: 2 INJECTION INTRAMUSCULAR; INTRAVENOUS at 19:46

## 2021-01-01 RX ADMIN — SODIUM CHLORIDE: 9 INJECTION, SOLUTION INTRAVENOUS at 16:51

## 2021-01-01 RX ADMIN — OXYCODONE 5 MG: 5 TABLET ORAL at 14:53

## 2021-01-01 RX ADMIN — MORPHINE SULFATE 2 MG: 2 INJECTION, SOLUTION INTRAMUSCULAR; INTRAVENOUS at 00:31

## 2021-01-01 RX ADMIN — LEVOTHYROXINE SODIUM 125 MCG: 0.12 TABLET ORAL at 04:06

## 2021-01-01 RX ADMIN — SODIUM CHLORIDE: 9 INJECTION, SOLUTION INTRAVENOUS at 01:07

## 2021-01-01 RX ADMIN — OXYCODONE 5 MG: 5 TABLET ORAL at 04:05

## 2021-01-01 RX ADMIN — MORPHINE SULFATE 2 MG: 2 INJECTION, SOLUTION INTRAMUSCULAR; INTRAVENOUS at 08:42

## 2021-01-01 RX ADMIN — LEVETIRACETAM 500 MG: 500 TABLET, FILM COATED ORAL at 08:26

## 2021-01-01 RX ADMIN — OXYCODONE 5 MG: 5 TABLET ORAL at 20:54

## 2021-01-01 RX ADMIN — MORPHINE SULFATE 4 MG: 4 INJECTION, SOLUTION INTRAMUSCULAR; INTRAVENOUS at 11:26

## 2021-01-01 RX ADMIN — LORAZEPAM 0.5 MG: 2 INJECTION INTRAMUSCULAR; INTRAVENOUS at 09:42

## 2021-01-01 RX ADMIN — MORPHINE SULFATE 2 MG: 2 INJECTION, SOLUTION INTRAMUSCULAR; INTRAVENOUS at 11:22

## 2021-01-01 RX ADMIN — DEXAMETHASONE 4 MG: 4 TABLET ORAL at 16:58

## 2021-01-01 RX ADMIN — METOPROLOL TARTRATE 25 MG: 25 TABLET, FILM COATED ORAL at 08:26

## 2021-01-01 RX ADMIN — PROPOFOL 40 MG: 10 INJECTION, EMULSION INTRAVENOUS at 14:32

## 2021-01-01 RX ADMIN — MORPHINE SULFATE 4 MG: 4 INJECTION, SOLUTION INTRAMUSCULAR; INTRAVENOUS at 10:07

## 2021-01-01 RX ADMIN — ENOXAPARIN SODIUM 40 MG: 40 INJECTION, SOLUTION INTRAVENOUS; SUBCUTANEOUS at 08:45

## 2021-01-01 ASSESSMENT — ENCOUNTER SYMPTOMS
WHEEZING: 0
BACK PAIN: 0
NAUSEA: 0
SORE THROAT: 0
BLOOD IN STOOL: 1
FACIAL SWELLING: 0
COUGH: 1
BACK PAIN: 1
EYE PAIN: 0
RECTAL PAIN: 0
ROS SKIN COMMENTS: VITILIGO
DIARRHEA: 0
CONSTIPATION: 0
SHORTNESS OF BREATH: 1
WHEEZING: 0
COUGH: 0
ABDOMINAL PAIN: 1
CHEST TIGHTNESS: 0
NAUSEA: 0
TROUBLE SWALLOWING: 0
EYE DISCHARGE: 0
RHINORRHEA: 0
COUGH: 0
ABDOMINAL PAIN: 1
DIARRHEA: 1
EYE DISCHARGE: 0
VOMITING: 0
CONSTIPATION: 0
VOMITING: 0
SORE THROAT: 0
BACK PAIN: 1
COLOR CHANGE: 0
CHEST TIGHTNESS: 0
SHORTNESS OF BREATH: 0
ABDOMINAL DISTENTION: 0

## 2021-01-01 ASSESSMENT — PAIN DESCRIPTION - PROGRESSION
CLINICAL_PROGRESSION: NOT CHANGED
CLINICAL_PROGRESSION: GRADUALLY WORSENING
CLINICAL_PROGRESSION: NOT CHANGED
CLINICAL_PROGRESSION: GRADUALLY WORSENING
CLINICAL_PROGRESSION: NOT CHANGED
CLINICAL_PROGRESSION: NOT CHANGED
CLINICAL_PROGRESSION: GRADUALLY WORSENING
CLINICAL_PROGRESSION: NOT CHANGED
CLINICAL_PROGRESSION: GRADUALLY IMPROVING
CLINICAL_PROGRESSION: NOT CHANGED
CLINICAL_PROGRESSION: GRADUALLY WORSENING
CLINICAL_PROGRESSION: NOT CHANGED
CLINICAL_PROGRESSION: NOT CHANGED
CLINICAL_PROGRESSION: GRADUALLY IMPROVING
CLINICAL_PROGRESSION: NOT CHANGED
CLINICAL_PROGRESSION: GRADUALLY WORSENING
CLINICAL_PROGRESSION: NOT CHANGED
CLINICAL_PROGRESSION: NOT CHANGED
CLINICAL_PROGRESSION: GRADUALLY WORSENING
CLINICAL_PROGRESSION: NOT CHANGED
CLINICAL_PROGRESSION: GRADUALLY WORSENING
CLINICAL_PROGRESSION: GRADUALLY WORSENING
CLINICAL_PROGRESSION: NOT CHANGED
CLINICAL_PROGRESSION: GRADUALLY WORSENING
CLINICAL_PROGRESSION: GRADUALLY WORSENING
CLINICAL_PROGRESSION: NOT CHANGED
CLINICAL_PROGRESSION: GRADUALLY WORSENING
CLINICAL_PROGRESSION: NOT CHANGED
CLINICAL_PROGRESSION: GRADUALLY WORSENING
CLINICAL_PROGRESSION: NOT CHANGED
CLINICAL_PROGRESSION: GRADUALLY WORSENING
CLINICAL_PROGRESSION: NOT CHANGED
CLINICAL_PROGRESSION: GRADUALLY WORSENING
CLINICAL_PROGRESSION: NOT CHANGED
CLINICAL_PROGRESSION: GRADUALLY WORSENING
CLINICAL_PROGRESSION: NOT CHANGED
CLINICAL_PROGRESSION: GRADUALLY WORSENING
CLINICAL_PROGRESSION: NOT CHANGED
CLINICAL_PROGRESSION: GRADUALLY WORSENING
CLINICAL_PROGRESSION: NOT CHANGED
CLINICAL_PROGRESSION: GRADUALLY WORSENING

## 2021-01-01 ASSESSMENT — PAIN SCALES - WONG BAKER
WONGBAKER_NUMERICALRESPONSE: 2
WONGBAKER_NUMERICALRESPONSE: 8
WONGBAKER_NUMERICALRESPONSE: 2
WONGBAKER_NUMERICALRESPONSE: 2
WONGBAKER_NUMERICALRESPONSE: 8
WONGBAKER_NUMERICALRESPONSE: 2
WONGBAKER_NUMERICALRESPONSE: 8
WONGBAKER_NUMERICALRESPONSE: 2
WONGBAKER_NUMERICALRESPONSE: 2
WONGBAKER_NUMERICALRESPONSE: 8
WONGBAKER_NUMERICALRESPONSE: 10
WONGBAKER_NUMERICALRESPONSE: 8
WONGBAKER_NUMERICALRESPONSE: 2
WONGBAKER_NUMERICALRESPONSE: 8
WONGBAKER_NUMERICALRESPONSE: 2
WONGBAKER_NUMERICALRESPONSE: 2

## 2021-01-01 ASSESSMENT — PAIN DESCRIPTION - LOCATION
LOCATION: HIP;GENERALIZED
LOCATION: HIP
LOCATION: HIP;GENERALIZED
LOCATION: HIP
LOCATION: HIP;GENERALIZED
LOCATION: HIP
LOCATION: GENERALIZED
LOCATION: HIP

## 2021-01-01 ASSESSMENT — PAIN DESCRIPTION - ORIENTATION
ORIENTATION: RIGHT;LEFT
ORIENTATION: LEFT;RIGHT

## 2021-01-01 ASSESSMENT — PAIN DESCRIPTION - PAIN TYPE
TYPE: ACUTE PAIN

## 2021-01-01 ASSESSMENT — PAIN - FUNCTIONAL ASSESSMENT
PAIN_FUNCTIONAL_ASSESSMENT: PREVENTS OR INTERFERES SOME ACTIVE ACTIVITIES AND ADLS
PAIN_FUNCTIONAL_ASSESSMENT: PREVENTS OR INTERFERES SOME ACTIVE ACTIVITIES AND ADLS
PAIN_FUNCTIONAL_ASSESSMENT: 0-10
PAIN_FUNCTIONAL_ASSESSMENT: PREVENTS OR INTERFERES SOME ACTIVE ACTIVITIES AND ADLS
PAIN_FUNCTIONAL_ASSESSMENT: PREVENTS OR INTERFERES WITH MANY ACTIVE NOT PASSIVE ACTIVITIES
PAIN_FUNCTIONAL_ASSESSMENT: PREVENTS OR INTERFERES SOME ACTIVE ACTIVITIES AND ADLS

## 2021-01-01 ASSESSMENT — PAIN SCALES - GENERAL
PAINLEVEL_OUTOF10: 8
PAINLEVEL_OUTOF10: 9
PAINLEVEL_OUTOF10: 7
PAINLEVEL_OUTOF10: 0
PAINLEVEL_OUTOF10: 8
PAINLEVEL_OUTOF10: 10
PAINLEVEL_OUTOF10: 7
PAINLEVEL_OUTOF10: 10
PAINLEVEL_OUTOF10: 7
PAINLEVEL_OUTOF10: 10
PAINLEVEL_OUTOF10: 7
PAINLEVEL_OUTOF10: 8
PAINLEVEL_OUTOF10: 7
PAINLEVEL_OUTOF10: 10
PAINLEVEL_OUTOF10: 7
PAINLEVEL_OUTOF10: 0
PAINLEVEL_OUTOF10: 1
PAINLEVEL_OUTOF10: 8
PAINLEVEL_OUTOF10: 9
PAINLEVEL_OUTOF10: 8
PAINLEVEL_OUTOF10: 0
PAINLEVEL_OUTOF10: 8
PAINLEVEL_OUTOF10: 0
PAINLEVEL_OUTOF10: 10
PAINLEVEL_OUTOF10: 9
PAINLEVEL_OUTOF10: 10
PAINLEVEL_OUTOF10: 0
PAINLEVEL_OUTOF10: 10
PAINLEVEL_OUTOF10: 8
PAINLEVEL_OUTOF10: 0
PAINLEVEL_OUTOF10: 7
PAINLEVEL_OUTOF10: 8
PAINLEVEL_OUTOF10: 4
PAINLEVEL_OUTOF10: 10
PAINLEVEL_OUTOF10: 7
PAINLEVEL_OUTOF10: 0
PAINLEVEL_OUTOF10: 8
PAINLEVEL_OUTOF10: 0
PAINLEVEL_OUTOF10: 8
PAINLEVEL_OUTOF10: 2
PAINLEVEL_OUTOF10: 8
PAINLEVEL_OUTOF10: 7
PAINLEVEL_OUTOF10: 7
PAINLEVEL_OUTOF10: 0
PAINLEVEL_OUTOF10: 8
PAINLEVEL_OUTOF10: 0
PAINLEVEL_OUTOF10: 0
PAINLEVEL_OUTOF10: 9
PAINLEVEL_OUTOF10: 9
PAINLEVEL_OUTOF10: 10
PAINLEVEL_OUTOF10: 7
PAINLEVEL_OUTOF10: 7

## 2021-01-01 ASSESSMENT — PAIN DESCRIPTION - ONSET
ONSET: AWAKENED FROM SLEEP
ONSET: PROGRESSIVE
ONSET: ON-GOING
ONSET: AWAKENED FROM SLEEP
ONSET: ON-GOING
ONSET: AWAKENED FROM SLEEP
ONSET: ON-GOING

## 2021-01-01 ASSESSMENT — PAIN DESCRIPTION - FREQUENCY
FREQUENCY: CONTINUOUS

## 2021-01-01 ASSESSMENT — PAIN DESCRIPTION - DESCRIPTORS
DESCRIPTORS: ACHING

## 2021-01-22 NOTE — OP NOTE
[facility] Endoscopy    Patient: Wayne Collins  : 1965  Acct#: [de-identified]  Date of evaluation: 2021  Primary Care Physician: NORMA Hayward NP     Procedure:    [x]EGD    []Enteroscopy    [x]Biopsy   []Dilation      []PEG    []PEG change    []PEG removal  []Variceal banding    []Control of bleeding  []Destruction of lesion by [facility]    []Stent Placement  []Foreign Body Removal    []Snare Polypectomy  []Other:     []Aborted:        []Colonoscopy  []Flex Sigmoid []EUS, rectal     []Biopsy   []Snare Polypectomy    []Control of bleeding  []Destruction of lesion by [facility]    []Stent Placement  []Foreign Body Removal    []Dilation    []Other:    []Aborted:   []Tattoo    Indication:   melena, diarrhea, hematochezia, GERD, left upper quadrant pain, and history of gastric ulcer    History:  Sayda Roque is a 61-year-old male who is being seen by Олег Pitts CNP 2020 due to blood in the stool. He states a few years ago he began having left upper quadrant pain intermittently. This is occurring 1 to a few times a month. He describes it as sharp and stinging. He rates it a 6 out of 10. NSAIDs and alcohol make this worse. Giving it time will make it better. He has associated melena intermittently once a month that will last 2 to 3 days, diarrhea, hematochezia small amount intermittently, flatulence, and significant bloating intermittently. He has GERD but states is well controlled. He is having 3-7 bowel movements a day type VI-VII on the [place] stool scale with occasional type IV on the Karn[place] stool scale. This is been his normal for many years. He uses NSAIDs approximately once a month. He has been on omeprazole 40 mg daily for years, however this was increased to omeprazole 40 mg twice a day 1 to 2 weeks prior to consult. He states he had an EGD and colonoscopy approximately 5 years ago.   He reports having polyps removed with a colonoscopy and findings of a gastric ulcer with EGD. He states his pain he is having is from his ulcer and that it never went away. Physical Exam:  VITALS: BP (!) 143/83   Pulse 76   Temp 98.1 °F (36.7 °C) (Temporal)   Resp 16   Ht 5' 9\" (1.753 m)   Wt 195 lb (88.5 kg)   SpO2 96%   BMI 28.80 kg/m²   The patient is a 50 y.o.  male in no acute distress. HEAD: Normal cephalic/atraumatic. Extra-occular motions intact bilaterally. NECK: No lymphadenopathy or bruits. CHEST: Rises equally on inspiration. Clear to auscultation bilaterally. CARDIOVASCULAR: Regular rate and rhythm without murmurs, rubs or gallops. ABDOMEN: Soft, nontender, and nondistended with normal bowel sounds. No Hepatosplemomegaly. UPPER EXTREMITIES: no cyanosis, clubbing, or edema. DERM: no rash or jaundice. LOWER EXTREMITIES: no cyanosis, clubbing, or edema. NEURO: Alert and oriented times four. Patient moves all extremities and has gross sensation in all extremities. ASA: ASA 2 - Patient with mild systemic disease with no functional limitations    MEDICATION:    MAC/Propofol/Anesthesia:  Yes     Photo:  Yes  Biopsy:  Yes      Description of Procedure: The risks and benefits of the procedure were described to the patient or their representative if unable to give consent including but not limited to bleeding, infection, poking a hole someplace requiring surgery to fix it, having a reaction to medication, and death. The patient or their representative if unable to give consent understood these risks and provided informed consent. Airway was assessed and is adequate for the planned sedation. Anesthesia: MAC  Estimated Blood Loss: less than 50   Complications: None  Specimens: Was Obtained:  see below     Sedation was administered by anesthesia who monitored the patient during the procedure. The patient was placed in the left lateral decubitus position.      A forward-viewing Olympus endoscope was lubricated and inserted through the mouth into the oropharynx. Under direct visualization, the upper esophagus was intubated. The scope was advanced through the esophagus and stomach to second portion of duodenum. Scope was slowly withdrawn with good views of mucosal surfaces. The scope was retroflexed in the fundus. Findings and maneuvers are listed in impression below. The patient tolerated the procedure well. The scope was removed. There were no immediate complications. IMPRESSION:  1. Esophagus - normal   2. Stomach - diffuse gastritis, biopsied for Hpylori using Carlos criteria  3. Duodenum - normal, biopsied    RECOMMENDATIONS:    1. Await pathology   2. Increase omeprazole to 40 mg daily taken 30-60\" before a meal (breakfast)  3. Follow-up with Yessica Tejeda CNP in 4-6 weeks at  [facility].  JAMES ROLON II.CARMELITA, [address] [facility]     Brenda Guy MD  2:42 PM 1/22/2021

## 2021-01-22 NOTE — H&P
[facility] Endoscopy    Pre-Endoscopy H&PE      Patient: Galina Collins    : 1965    Acct#: [de-identified]  Primary Care Physician: NORMA Nick NP   Date of evaluation: 2021    Planned Procedure:    [x]EGD    []Enteroscopy    []PEG    []PEG change    []PEG removal  []Variceal banding    [x]Biopsy   []Dilation      []Control of bleeding  []Destruction of lesion by [facility]    []Stent Placement  []Foreign Body Removal    []Snare Polypectomy  []Other:       []Colonoscopy  []Flex Sigmoid []EUS, rectal      []Biopsy   []Dilation      []Control of bleeding  []Destruction of lesion by [facility]    []Stent Placement  []Foreign Body Removal    []Snare Polypectomy  []Other:      Planned Sedation  []Conscious Sedation [x]MAC/Propofol []Anesthesia    []None    Airway:  Adequate for planned sedation    Indication:   melena, diarrhea, hematochezia, GERD, left upper quadrant pain, and history of gastric ulcer    History:  Rochelle Turcios is a 33-year-old male who is being seen by Annel Damon CNP 2020 due to blood in the stool. He states a few years ago he began having left upper quadrant pain intermittently. This is occurring 1 to a few times a month. He describes it as sharp and stinging. He rates it a 6 out of 10. NSAIDs and alcohol make this worse. Giving it time will make it better. He has associated melena intermittently once a month that will last 2 to 3 days, diarrhea, hematochezia small amount intermittently, flatulence, and significant bloating intermittently. He has GERD but states is well controlled. He is having 3-7 bowel movements a day type VI-VII on the [place] stool scale with occasional type IV on the [place] stool scale. This is been his normal for many years. He uses NSAIDs approximately once a month. He has been on omeprazole 40 mg daily for years, however this was increased to omeprazole 40 mg twice a day 1 to 2 weeks prior to consult.   He states he had an EGD and colonoscopy approximately 5 years ago. He reports having polyps removed with a colonoscopy and findings of a gastric ulcer with EGD. He states his pain he is having is from his ulcer and that it never went away. Physical Exam:  VITALS: BP (!) 143/83   Pulse 76   Temp 98.1 °F (36.7 °C) (Temporal)   Resp 16   Ht 5' 9\" (1.753 m)   Wt 195 lb (88.5 kg)   SpO2 96%   BMI 28.80 kg/m²   The patient is a 50 y.o.  male in no acute distress. HEAD: Normal cephalic/atraumatic. Extra-occular motions intact bilaterally. NECK: No lymphadenopathy or bruits. CHEST: Rises equally on inspiration. Clear to auscultation bilaterally. CARDIOVASCULAR: Regular rate and rhythm without murmurs, rubs or gallops. ABDOMEN: Soft, nontender, and nondistended with normal bowel sounds. No Hepatosplemomegaly. UPPER EXTREMITIES: no cyanosis, clubbing, or edema. DERM: no rash or jaundice. LOWER EXTREMITIES: no cyanosis, clubbing, or edema. NEURO: Alert and oriented times four. Patient moves all extremities and has gross sensation in all extremities. ASA: ASA 2 - Patient with mild systemic disease with no functional limitations    Past Medical History:    No past medical history on file. Past Surgical History:    No past surgical history on file. Allergies:  Patient has no allergy information on record. Home Meds:  Prior to Admission medications    Medication Sig Start Date End Date Taking?  Authorizing Provider   lovastatin (MEVACOR) 10 MG tablet Take 10 mg by mouth nightly   Yes Historical Provider, MD   omeprazole (PRILOSEC) 20 MG delayed release capsule Take 20 mg by mouth daily   Yes Historical Provider, MD   levothyroxine (SYNTHROID) 125 MCG tablet Take 125 mcg by mouth Daily   Yes Historical Provider, MD   metoprolol tartrate (LOPRESSOR) 25 MG tablet Take 25 mg by mouth 2 times daily   Yes Historical Provider, MD     Social History:   Social History     Socioeconomic History    Marital status: Unknown Spouse name: Not on file    Number of children: Not on file    Years of education: Not on file    Highest education level: Not on file   Occupational History    Not on file   Social Needs    Financial resource strain: Not on file    Food insecurity     Worry: Not on file     Inability: Not on file    Transportation needs     Medical: Not on file     Non-medical: Not on file   Tobacco Use    Smoking status: Not on file   Substance and Sexual Activity    Alcohol use: Not on file    Drug use: Not on file    Sexual activity: Not on file   Lifestyle    Physical activity     Days per week: Not on file     Minutes per session: Not on file    Stress: Not on file   Relationships    Social connections     Talks on phone: Not on file     Gets together: Not on file     Attends Religion service: Not on file     Active member of club or organization: Not on file     Attends meetings of clubs or organizations: Not on file     Relationship status: Not on file    Intimate partner violence     Fear of current or ex partner: Not on file     Emotionally abused: Not on file     Physically abused: Not on file     Forced sexual activity: Not on file   Other Topics Concern    Not on file   Social History Narrative    Not on file     Family History:   No GI malignancies     ROS:  GENERAL: No fever or chills. NEURO: No headache or seizure. EYES: No diplopia or vision loss. CARDIOVASCULAR: No chest pain or palpitations. RESPIRATORY: No dyspnea or cough. GI: YES melena. YES hematochezia   : No dysuria or hematuria. GYN:  Not appropriate. MUSCULOSKELETAL: No new arthralgias or myalgias  DERM: No rash or jaundice. ENDOCRINE: No polyuria or polydipsia. PSYCH: No anxiety or depression. Informed Consent:  The risks and benefits of the procedure have been discussed with either the patient or if they cannot consent, their representative.     Assessment:  Patient examined and appropriate for planned sedation and procedure. Plan:  Proceed with planned sedation and procedure.     Pilar Avelar MD  1:59 PM 1/22/2021

## 2021-01-22 NOTE — ANESTHESIA POSTPROCEDURE EVALUATION
Department of Anesthesiology  Postprocedure Note    Patient: Georges Alvarez  MRN: [id number]  YOB: 1965  Date of evaluation: 1/22/2021  Time:  2:41 PM     Procedure Summary     Date: 01/22/21 Room / Location: [address] [place] [address] / [address] [place] [address]    Anesthesia Start: 1278 Anesthesia Stop: 1440    Procedure: EGD BIOPSY (N/A ) Diagnosis: (MELENA, BLOATING, GERD)    Surgeons: Jovi Guerrero MD Responsible Provider: Michael Gaming DO    Anesthesia Type: MAC ASA Status: 3          Anesthesia Type: No value filed. Jamal Phase I:      Jamal Phase II:      Last vitals: Reviewed and per EMR flowsheets.        Anesthesia Post Evaluation    Patient location during evaluation: PACU  Patient participation: complete - patient participated  Level of consciousness: awake  Pain score: 0  Airway patency: patent  Nausea & Vomiting: no vomiting and no nausea  Complications: no  Cardiovascular status: hemodynamically stable  Respiratory status: spontaneous ventilation and room air  Hydration status: stable

## 2021-01-22 NOTE — ANESTHESIA PRE PROCEDURE
Department of Anesthesiology  Preprocedure Note       Name:  Pavan Toussaint   Age:  50 y.o.  :  1965                                          MRN:  [id number]         Date:  2021      Surgeon: Sarah Carlisle):  Kanwal Hoyt MD    Procedure: Procedure(s):  EGD ESOPHAGOGASTRODUODENOSCOPY    Medications prior to admission:   Prior to Admission medications    Medication Sig Start Date End Date Taking? Authorizing Provider   lovastatin (MEVACOR) 10 MG tablet Take 10 mg by mouth nightly   Yes Historical Provider, MD   omeprazole (PRILOSEC) 20 MG delayed release capsule Take 20 mg by mouth daily   Yes Historical Provider, MD   levothyroxine (SYNTHROID) 125 MCG tablet Take 125 mcg by mouth Daily   Yes Historical Provider, MD   metoprolol tartrate (LOPRESSOR) 25 MG tablet Take 25 mg by mouth 2 times daily   Yes Historical Provider, MD       Current medications:    No current facility-administered medications for this encounter. Allergies: Allergies   Allergen Reactions    Aspirin      Rye syndrome         Problem List:  There is no problem list on file for this patient.       Past Medical History:        Diagnosis Date    Arthritis     COPD (chronic obstructive pulmonary disease) (HCC)     GERD (gastroesophageal reflux disease)     Hyperlipidemia     Hypertension     Thyroid disease        Past Surgical History:        Procedure Laterality Date    CHOLECYSTECTOMY      COLONOSCOPY      ENDOSCOPY, COLON, DIAGNOSTIC      FRACTURE SURGERY Right     foot    TONSILLECTOMY         Social History:    Social History     Tobacco Use    Smoking status: Current Every Day Smoker     Packs/day: 1.00     Years: 30.00     Pack years: 30.00    Smokeless tobacco: Never Used   Substance Use Topics    Alcohol use: Yes     Comment: 3-4 times/ week beer                                Ready to quit: No  Counseling given: No      Vital Signs (Current):   Vitals:    21 1312   BP: (!) 143/83 Pulse: 76   Resp: 16   Temp: 36.7 °C (98.1 °F)   TempSrc: Temporal   SpO2: 96%   Weight: 195 lb (88.5 kg)   Height: 5' 9\" (1.753 m)                                              BP Readings from Last 3 Encounters:   01/22/21 (!) 143/83       NPO Status: Time of last liquid consumption: 0830(sip of water with meds)                        Time of last solid consumption: 2100                        Date of last liquid consumption: 01/22/21                        Date of last solid food consumption: 01/21/21    BMI:   Wt Readings from Last 3 Encounters:   01/22/21 195 lb (88.5 kg)     Body mass index is 28.8 kg/m². CBC:   Lab Results   Component Value Date    WBC 7.1 11/24/2020    RBC 5.07 11/24/2020    HGB 15.7 11/24/2020    HCT 47.3 11/24/2020    MCV 93.3 11/24/2020    RDW 12.7 11/24/2020     11/24/2020       CMP:   Lab Results   Component Value Date     11/24/2020    K 4.1 11/24/2020     11/24/2020    CO2 23 11/24/2020    BUN 14 11/24/2020    CREATININE 0.95 11/24/2020    GFRAA >60 11/24/2020    LABGLOM >60 11/24/2020    GLUCOSE 106 11/24/2020    PROT 7.0 11/24/2020    CALCIUM 9.4 11/24/2020    BILITOT 0.28 11/24/2020    ALKPHOS 68 11/24/2020    AST 20 11/24/2020    ALT 25 11/24/2020       POC Tests: No results for input(s): POCGLU, POCNA, POCK, POCCL, POCBUN, POCHEMO, POCHCT in the last 72 hours.     Coags: No results found for: PROTIME, INR, APTT    HCG (If Applicable): No results found for: PREGTESTUR, PREGSERUM, HCG, HCGQUANT     ABGs: No results found for: PHART, PO2ART, HJQ8XVR, QKG5WYE, BEART, T4JHAAOO     Type & Screen (If Applicable):  No results found for: LABABO, LABRH    Drug/Infectious Status (If Applicable):  Lab Results   Component Value Date    HEPCAB NONREACTIVE 07/10/2019       COVID-19 Screening (If Applicable):   Lab Results   Component Value Date    COVID19 Not Detected 01/15/2021         Anesthesia Evaluation  Patient summary reviewed and Nursing notes reviewed Airway: Mallampati: II  TM distance: >3 FB   Neck ROM: full  Mouth opening: > = 3 FB Dental:          Pulmonary:normal exam    (+) COPD:                             Cardiovascular:    (+) hypertension:,                   Neuro/Psych:   Negative Neuro/Psych ROS              GI/Hepatic/Renal:   (+) GERD:,           Endo/Other: Negative Endo/Other ROS                    Abdominal:           Vascular: negative vascular ROS. Anesthesia Plan      MAC     ASA 3       Induction: intravenous. Anesthetic plan and risks discussed with patient.                       NORMA Medina - CRNA   1/22/2021

## 2021-01-22 NOTE — PROGRESS NOTES
..Recovery mode, PT denies discomfort. Passing gas, taking fluids. Dr. Radha Oviedo discussed findings and plan of care with pt and , understanding verbalized.

## 2021-04-02 NOTE — PROGRESS NOTES
Scope # [id number]  Colonoscopy complete  Pictures taken  Patient tolerated procedure well  7 specimen jars labeled and sent to lab

## 2021-04-02 NOTE — ANESTHESIA PRE PROCEDURE
History:        Procedure Laterality Date    CHOLECYSTECTOMY      COLONOSCOPY      ENDOSCOPY, COLON, DIAGNOSTIC      FRACTURE SURGERY Right     foot    TONSILLECTOMY      UPPER GASTROINTESTINAL ENDOSCOPY N/A 1/22/2021    EGD BIOPSY performed by Leandra Cerna MD at [facility]       Social History:    Social History     Tobacco Use    Smoking status: Current Every Day Smoker     Packs/day: 1.00     Years: 30.00     Pack years: 30.00    Smokeless tobacco: Never Used   Substance Use Topics    Alcohol use: Yes     Comment: 3-4 times/ week beer                                Ready to quit: Not Answered  Counseling given: Not Answered      Vital Signs (Current):   Vitals:    04/02/21 1319   BP: (!) 142/80   Pulse: 89   Resp: 16   Temp: 36.5 °C (97.7 °F)   TempSrc: Oral   SpO2: 96%   Weight: 198 lb 12.8 oz (90.2 kg)   Height: 5' 9\" (1.753 m)                                              BP Readings from Last 3 Encounters:   04/02/21 (!) 142/80   01/22/21 (!) 146/69   01/22/21 (!) 161/63       NPO Status:                                                                                 BMI:   Wt Readings from Last 3 Encounters:   04/02/21 198 lb 12.8 oz (90.2 kg)   01/22/21 195 lb (88.5 kg)     Body mass index is 29.36 kg/m².     CBC:   Lab Results   Component Value Date    WBC 7.1 11/24/2020    RBC 5.07 11/24/2020    HGB 15.7 11/24/2020    HCT 47.3 11/24/2020    MCV 93.3 11/24/2020    RDW 12.7 11/24/2020     11/24/2020       CMP:   Lab Results   Component Value Date     11/24/2020    K 4.1 11/24/2020     11/24/2020    CO2 23 11/24/2020    BUN 14 11/24/2020    CREATININE 0.95 11/24/2020    GFRAA >60 11/24/2020    LABGLOM >60 11/24/2020    GLUCOSE 106 11/24/2020    PROT 7.0 11/24/2020    CALCIUM 9.4 11/24/2020    BILITOT 0.28 11/24/2020    ALKPHOS 68 11/24/2020    AST 20 11/24/2020    ALT 25 11/24/2020       POC Tests: No results for input(s): POCGLU, SHAHRIAR, MARIANNE, POCCL, POCBUN, POCHEMO, POCHCT in the last 72 hours. Coags: No results found for: PROTIME, INR, APTT    HCG (If Applicable): No results found for: PREGTESTUR, PREGSERUM, HCG, HCGQUANT     ABGs: No results found for: PHART, PO2ART, FRZ1JQA, AAE0PZP, BEART, A5CZWLEH     Type & Screen (If Applicable):  No results found for: LABABO, LABRH    Drug/Infectious Status (If Applicable):  Lab Results   Component Value Date    HEPCAB NONREACTIVE 07/10/2019       COVID-19 Screening (If Applicable):   Lab Results   Component Value Date    COVID19 Not Detected 03/26/2021           Anesthesia Evaluation  Patient summary reviewed and Nursing notes reviewed  Airway: Mallampati: II  TM distance: >3 FB   Neck ROM: full  Mouth opening: > = 3 FB Dental: normal exam         Pulmonary:normal exam    (+) COPD:                             Cardiovascular:    (+) hypertension:, hyperlipidemia                  Neuro/Psych:   (+) seizures:,             GI/Hepatic/Renal:   (+) GERD:,           Endo/Other:    (+) hypothyroidism::., .                 Abdominal:           Vascular: negative vascular ROS. Anesthesia Plan      MAC     ASA 2       Induction: intravenous. Anesthetic plan and risks discussed with patient and spouse. Plan discussed with CRNA and attending.     Attending anesthesiologist reviewed and agrees with NORMA Moody CRNA   4/2/2021

## 2021-04-02 NOTE — OP NOTE
[facility]    Patient: Kinsey Collins  : 1965  Acct#: [de-identified]  Date of evaluation: 2021  Primary Care Physician: NORMA Webber NP     Procedure:    []EGD    []Enteroscopy    []Biopsy   []Dilation      []PEG    []PEG change    []PEG removal  []Variceal banding    []Control of bleeding  []Destruction of lesion by [facility]    []Stent Placement  []Foreign Body Removal    []Snare Polypectomy  []Other:     []Aborted:        [x]Colonoscopy  []Flex Sigmoid []EUS, rectal     [x]Biopsy   [x]Snare Polypectomy    []Control of bleeding  []Destruction of lesion by [facility]    []Stent Placement  []Foreign Body Removal    []Dilation    []Other:    []Aborted:   []Tattoo    Indication:   Hematochezia, diarrhea, left-sided abdominal pain, melena, and personal history of colon polyps    History:  The patient is a 50 y.o. male seen by Lucy Lemus CNP 2020 for melena, diarrhea, hematochezia, GERD, left upper quadrant pain, and history of gastric ulcer. I performed an EGD 21 that demonstrated diffuse gastritis with biopsies negative for Celiac and Hpylori. I increased omeprazole to 40 mg daily. Biopsies did show some intestinal metaplasia of the antrum, and I recommended repeat EGD in 5 years. He returned and saw Lucy Lemus CNP 21 with LUQ pain, flatulence, bloating, GERD, diarrhea, hemorrhoids. Colonoscopy is planned to further evaluate hematochezia, flatulence, left-sided abdominal pain, melena, bloating, and personal history of colon polyps    Physical Exam:  VITALS: BP (!) 142/80   Pulse 89   Temp 97.7 °F (36.5 °C) (Oral)   Resp 16   Ht 5' 9\" (1.753 m)   Wt 198 lb 12.8 oz (90.2 kg)   SpO2 96%   BMI 29.36 kg/m²   The patient is a 50 y.o. male in no acute distress. HEAD: Normal cephalic/atraumatic. Extra-occular motions intact bilaterally. NECK: No lymphadenopathy or bruits. CHEST: Rises equally on inspiration.  Clear to auscultation bilaterally. CARDIOVASCULAR: Regular rate and rhythm without murmurs, rubs or gallops. ABDOMEN: Soft, nontender, and nondistended with normal bowel sounds. No Hepatosplemomegaly. UPPER EXTREMITIES: no cyanosis, clubbing, or edema. DERM: no rash or jaundice. LOWER EXTREMITIES: no cyanosis, clubbing, or edema. NEURO: Alert and oriented times four. Patient moves all extremities and has gross sensation in all extremities. ASA: ASA 2 - Patient with mild systemic disease with no functional limitations    MEDICATION:    MAC/Propofol/Anesthesia:  Yes     Photo:  Yes  Biopsy:  Yes      Description of Procedure: The risks and benefits of the procedure were described to the patient or their representative if unable to give consent including but not limited to bleeding, infection, poking a hole someplace requiring surgery to fix it, having a reaction to medication, and death. The patient or their representative if unable to give consent understood these risks and provided informed consent. Airway was assessed and is adequate for the planned sedation. Anesthesia: MAC  Estimated Blood Loss: less than 50   Complications: None  Specimens: Was Obtained:  see below     Sedation was administered by anesthesia who monitored the patient during the procedure. The patient was placed in the left lateral decubitus position. The perianal area was inspected, and a digital rectal exam was performed. A forward-viewing Olympus adult colonoscope was lubricated and inserted through the anus into the rectum. Under direct visualization, this was advanced to the terminal ileum. Cecal base was identified by the ileocecal valve and appendiceal orifice. The scope was then slowly withdrawn with good views of mucosal surfaces. The scope was retroflexed in the rectum. Findings and maneuvers are listed in impression below. The patient tolerated the procedure well. The scope was removed.  The patient was moved to the recovery area. There were no immediate complications. IMPRESSION:  1.  12 mm cecal polyp removed with a cold snare in a piecemeal fashion  2.  5-6 mm cecal polyp removed with cold snare  3.  5-6 mm ascending colon polyp removed with cold snare  4. Two 3-4 mm descending colon polyps removed with biopsy forceps   5. Sigmoid colon diverticulosis  6. Four 4-6 mm sigmoid polyps removed with cold snare  7. Grade 1 internal hemorrhoids  8. Otherwise normal colon and TI with biopsies taken from TI, Ascending colon, and Descending colon given the patient's abdominal pain and diarrhea. RECOMMENDATIONS:    1. Await pathology results   2. High Fiber diet  3. Repeat colonoscopy depending upon pathology results. If large cecal polyp is adenomatous, repeat colonoscopy should be performed in 6 months to ensure complete resection  4. Follow-up with Fatemeh Machado CNP in 4-6 weeks.     Dylan Sanchez MD  2:21 PM 4/2/2021

## 2021-04-02 NOTE — ANESTHESIA POSTPROCEDURE EVALUATION
Department of Anesthesiology  Postprocedure Note    Patient: William Mabry  MRN: [id number]  YOB: 1965  Date of evaluation: 4/2/2021  Time:  2:43 PM     Procedure Summary     Date: 04/02/21 Room / Location: [address] [place] [address] / [address] [place] [address]    Anesthesia Start: 9296 Anesthesia Stop: 1279    Procedures:       COLONOSCOPY WITH BIOPSY      COLONOSCOPY POLYPECTOMY SNARE/COLD BIOPSY Diagnosis: (BLOATING, MELENA, HEMATOCHEZIA)    Surgeons: Hermila Velasquez MD Responsible Provider: Sumit Rice DO    Anesthesia Type: MAC ASA Status: 2          Anesthesia Type: MAC    Jamal Phase I: Jamal Score: 10    Jamal Phase II: Jamal Score: 10    Last vitals: Reviewed and per EMR flowsheets.        Anesthesia Post Evaluation    Patient location during evaluation: bedside  Patient participation: complete - patient participated  Level of consciousness: awake and alert  Airway patency: patent  Nausea & Vomiting: no nausea and no vomiting  Complications: no  Cardiovascular status: hemodynamically stable  Respiratory status: spontaneous ventilation and acceptable  Hydration status: euvolemic

## 2021-04-02 NOTE — H&P
[address] [address] Endoscopy    Pre-Endoscopy H&PE      Patient: Verónica Collins    : 1965    Acct#: [de-identified]  Primary Care Physician: NORMA Uribe NP   Date of evaluation: 2021    Planned Procedure:    []EGD    []Enteroscopy    []PEG    []PEG change    []PEG removal  []Variceal banding    []Biopsy   []Dilation      []Control of bleeding  []Destruction of lesion by [facility]    []Stent Placement  []Foreign Body Removal    []Snare Polypectomy  []Other:       [x]Colonoscopy  []Flex Sigmoid []EUS, rectal      [x]Biopsy   []Dilation      []Control of bleeding  []Destruction of lesion by [facility]    []Stent Placement  []Foreign Body Removal    []Snare Polypectomy  []Other:      Planned Sedation  []Conscious Sedation [x]MAC/Propofol []Anesthesia    []None    Airway:  Adequate for planned sedation    Indication:   Hematochezia, diarrhea, left-sided abdominal pain, melena, and personal history of colon polyps    History:  The patient is a 50 y.o. male seen by Johny Franklin CNP 2020 for melena, diarrhea, hematochezia, GERD, left upper quadrant pain, and history of gastric ulcer. I performed an EGD 21 that demonstrated diffuse gastritis with biopsies negative for Celiac and Hpylori. I increased omeprazole to 40 mg daily. Biopsies did show some intestinal metaplasia of the antrum, and I recommended repeat EGD in 5 years. He returned and saw Johny Franklin CNP 21 with LUQ pain, flatulence, bloating, GERD, diarrhea, hemorrhoids. Colonoscopy is planned to further evaluate hematochezia, flatulence, left-sided abdominal pain, melena, bloating, and personal history of colon polyps    Physical Exam:  VITALS: BP (!) 142/80   Pulse 89   Temp 97.7 °F (36.5 °C) (Oral)   Resp 16   Ht 5' 9\" (1.753 m)   Wt 198 lb 12.8 oz (90.2 kg)   SpO2 96%   BMI 29.36 kg/m²   The patient is a 50 y.o. male in no acute distress. HEAD: Normal cephalic/atraumatic.  Extra-occular motions intact bilaterally. NECK: No lymphadenopathy or bruits. CHEST: Rises equally on inspiration. Clear to auscultation bilaterally. CARDIOVASCULAR: Regular rate and rhythm without murmurs, rubs or gallops. ABDOMEN: Soft, nontender, and nondistended with normal bowel sounds. No Hepatosplemomegaly. UPPER EXTREMITIES: no cyanosis, clubbing, or edema. DERM: no rash or jaundice. LOWER EXTREMITIES: no cyanosis, clubbing, or edema. NEURO: Alert and oriented times four. Patient moves all extremities and has gross sensation in all extremities. ASA: ASA 2 - Patient with mild systemic disease with no functional limitations    Past Medical History:        Diagnosis Date    Arthritis     COPD (chronic obstructive pulmonary disease) (HCC)     GERD (gastroesophageal reflux disease)     Hyperlipidemia     Hypertension     Thyroid disease      Past Surgical History:        Procedure Laterality Date    CHOLECYSTECTOMY      COLONOSCOPY      ENDOSCOPY, COLON, DIAGNOSTIC      FRACTURE SURGERY Right     foot    TONSILLECTOMY      UPPER GASTROINTESTINAL ENDOSCOPY N/A 1/22/2021    EGD BIOPSY performed by Reg Chirinos MD at [facility]     Allergies:  Aspirin  Home Meds:  Prior to Admission medications    Medication Sig Start Date End Date Taking?  Authorizing Provider   albuterol (ACCUNEB) 0.63 MG/3ML nebulizer solution Take 1 ampule by nebulization every 6 hours as needed for Wheezing   Yes Historical Provider, MD   lovastatin (MEVACOR) 10 MG tablet Take 10 mg by mouth nightly   Yes Historical Provider, MD   levothyroxine (SYNTHROID) 125 MCG tablet Take 125 mcg by mouth Daily   Yes Historical Provider, MD   metoprolol tartrate (LOPRESSOR) 25 MG tablet Take 25 mg by mouth 2 times daily   Yes Historical Provider, MD   omeprazole (PRILOSEC) 40 MG delayed release capsule Take 1 capsule by mouth daily 30-60\" before a meal (breakfast) 1/22/21  Yes Reg Chirinos MD     Social History:   Social History     Socioeconomic History    Marital status:      Spouse name: Not on file    Number of children: Not on file    Years of education: Not on file    Highest education level: Not on file   Occupational History    Not on file   Social Needs    Financial resource strain: Not on file    Food insecurity     Worry: Not on file     Inability: Not on file    Transportation needs     Medical: Not on file     Non-medical: Not on file   Tobacco Use    Smoking status: Current Every Day Smoker     Packs/day: 1.00     Years: 30.00     Pack years: 30.00    Smokeless tobacco: Never Used   Substance and Sexual Activity    Alcohol use: Yes     Comment: 3-4 times/ week beer    Drug use: Never    Sexual activity: Not on file   Lifestyle    Physical activity     Days per week: Not on file     Minutes per session: Not on file    Stress: Not on file   Relationships    Social connections     Talks on phone: Not on file     Gets together: Not on file     Attends Gnosticist service: Not on file     Active member of club or organization: Not on file     Attends meetings of clubs or organizations: Not on file     Relationship status: Not on file    Intimate partner violence     Fear of current or ex partner: Not on file     Emotionally abused: Not on file     Physically abused: Not on file     Forced sexual activity: Not on file   Other Topics Concern    Not on file   Social History Narrative    Not on file     Family History:   No GI malignancies    ROS:  GENERAL: No fever or chills. NEURO: No headache or seizure. EYES: No diplopia or vision loss. CARDIOVASCULAR: No chest pain or palpitations. RESPIRATORY: No dyspnea or cough. GI: NO melena. NO hematochezia   : No dysuria or hematuria. GYN:  Not appropriate. MUSCULOSKELETAL: No new arthralgias or myalgias  DERM: No rash or jaundice. ENDOCRINE: No polyuria or polydipsia. PSYCH: No anxiety or depression.     Informed Consent:  The risks and benefits of the procedure have been discussed with either the patient or if they cannot consent, their representative. Assessment:  Patient examined and appropriate for planned sedation and procedure. Plan:  Proceed with planned sedation and procedure.     Devang Singh MD  1:25 PM 4/2/2021

## 2021-05-06 NOTE — PATIENT INSTRUCTIONS
1. Request imaging studies from the [address] [place] [address] the patient had CT of the abdomen in April 2021  2. Labs today: CEA, CA 19-9,  3.  Dr. Nicol Mendiola will make decision about PET scan versus MRI after reviewing CT from [address] [place] [address]

## 2021-05-06 NOTE — PROGRESS NOTES
[facility], [address] [facility] [address]  [place]  Dept: [phone]  Dept Fax: [phone]: [phone]    Visit Date:5/6/2021     River Singleton is a 49 y.o. male who presents today for:   Chief Complaint   Patient presents with    New Patient     Secondary Malignant Neoplasm of Liver         HPI:   Patient presents to the medical oncology clinic to discuss further management of diffusely metastatic disease found on the recent CT scan. The patient was seen in local emergency room in December 2020 due to melena. No x-ray showed normal pattern, his labs were within normal range. Was referred to GI consult, he endorsed having intermittent right upper quadrant pain over the last year. In 2015 he had EGDs that showed  gastric ulcers and he attributed the symptoms to peptic ulcer disease. He had EGDs in January 2021 showed a diffuse gastritis. Biopsy showed intestinal metaplasia, H. pylori was negative. Underwent colonoscopy on April 2, 2021. Multiple biopsies were taken, they showed:  A: Terminal ileum, normal-appearing, biopsies:    Small intestinal mucosa with no significant pathologic findings. B: Ascending colon, normal-appearing, biopsies:    Colonic mucosa with no significant pathologic findings.    Negative for active colitis and microscopic colitis. C: Cecal polyps x2, biopsies:    Sessile serrated adenomas/polyps. D: Ascending colon polyps x2, biopsies:    Tubular adenoma and sessile serrated adenoma/polyp. E: Descending colon, biopsies:    Colonic mucosa with no significant pathologic findings.    Negative for active colitis and microscopic colitis. F: Descending colon polyps x2, biopsies:    Tubular adenoma.    Hyperplastic polyp. G: Sigmoid colon polyps x4, biopsies:    Hyperplastic polyps.    Due to persistent abdominal pain with the patient had CT of the abdomen [facility] on April 25, 2021 that showed multiple liver metastasis. The patient did not have any biopsy. He presents to the medical oncology clinic for further management. He is scheduled to have abdominal MRI on May 14, 2021. HPI   Past Medical History:   Diagnosis Date    Arthritis     COPD (chronic obstructive pulmonary disease) ([address]     GERD (gastroesophageal reflux disease)     Hyperlipidemia     Hypertension     Thyroid disease       Past Surgical History:   Procedure Laterality Date    CHOLECYSTECTOMY      COLONOSCOPY      COLONOSCOPY  4/2/2021    COLONOSCOPY WITH BIOPSY performed by Shell Cheatham MD at [facility]    COLONOSCOPY  4/2/2021    COLONOSCOPY POLYPECTOMY SNARE/COLD BIOPSY performed by Shell Cheatham MD at [facility]    ENDOSCOPY, COLON, DIAGNOSTIC      FRACTURE SURGERY Right     foot    TONSILLECTOMY      UPPER GASTROINTESTINAL ENDOSCOPY N/A 1/22/2021    EGD BIOPSY performed by Shell Cheatham MD at [facility]      History reviewed. No pertinent family history. Social History     Tobacco Use    Smoking status: Current Every Day Smoker     Packs/day: 1.00     Years: 30.00     Pack years: 30.00    Smokeless tobacco: Never Used   Substance Use Topics    Alcohol use: Yes     Comment: 3-4 times/ week beer      Current Outpatient Medications   Medication Sig Dispense Refill    lovastatin (MEVACOR) 10 MG tablet Take 10 mg by mouth nightly      levothyroxine (SYNTHROID) 125 MCG tablet Take 125 mcg by mouth Daily      metoprolol tartrate (LOPRESSOR) 25 MG tablet Take 25 mg by mouth 2 times daily      omeprazole (PRILOSEC) 40 MG delayed release capsule Take 1 capsule by mouth daily 30-60\" before a meal (breakfast) 30 capsule 11    albuterol (ACCUNEB) 0.63 MG/3ML nebulizer solution Take 1 ampule by nebulization every 6 hours as needed for Wheezing       No current facility-administered medications for this visit.       Allergies   Allergen Reactions    Aspirin      [place] syndrome        [facility] Topic Date Due    Pneumococcal 0-64 years Vaccine (1 of 4 - PCV13) Never done    COVID-19 Vaccine (1) Never done    DTaP/Tdap/Td vaccine (1 - Tdap) Never done    Diabetes screen  Never done    Shingles Vaccine (1 of 2) Never done    Flu vaccine (Season Ended) 09/01/2021    Lipid screen  04/30/2022    Colon cancer screen colonoscopy  04/02/2031    Hepatitis C screen  Completed    HIV screen  Completed    Hepatitis A vaccine  Aged Out    Hepatitis B vaccine  Aged Out    Hib vaccine  Aged Out    Meningococcal (ACWY) vaccine  Aged Out        Subjective:   Review of Systems   Constitutional: Positive for appetite change. Negative for activity change, fatigue and fever. HENT: Negative for congestion, dental problem, facial swelling, hearing loss, mouth sores, nosebleeds, sore throat, tinnitus and trouble swallowing. Eyes: Negative for discharge and visual disturbance. Respiratory: Negative for cough, chest tightness, shortness of breath and wheezing. Cardiovascular: Negative for chest pain, palpitations and leg swelling. Gastrointestinal: Positive for abdominal pain, blood in stool and diarrhea. Negative for abdominal distention, constipation, nausea, rectal pain and vomiting. Endocrine: Negative for cold intolerance, polydipsia and polyuria. Genitourinary: Negative for decreased urine volume, difficulty urinating, dysuria, flank pain, hematuria and urgency. Musculoskeletal: Positive for back pain. Negative for arthralgias, gait problem, joint swelling, myalgias and neck stiffness. Skin: Negative for color change, rash and wound. Neurological: Negative for dizziness, tremors, seizures, speech difficulty, weakness, light-headedness, numbness and headaches. Hematological: Negative for adenopathy. Does not bruise/bleed easily. Psychiatric/Behavioral: Negative for confusion and sleep disturbance. The patient is not nervous/anxious. Objective:   Physical Exam  Vitals reviewed. Constitutional:       General: He is not in acute distress. Appearance: He is well-developed. HENT:      Head: Normocephalic. Mouth/Throat:      Pharynx: No oropharyngeal exudate. Eyes:      General: No scleral icterus. Right eye: No discharge. Left eye: No discharge. Pupils: Pupils are equal, round, and reactive to light. Neck:      Thyroid: No thyromegaly. Vascular: No JVD. Trachea: No tracheal deviation. Cardiovascular:      Rate and Rhythm: Normal rate. Heart sounds: Normal heart sounds. No murmur heard. No friction rub. No gallop. Pulmonary:      Effort: Pulmonary effort is normal. No respiratory distress. Breath sounds: Normal breath sounds. No stridor. No wheezing or rales. Chest:      Chest wall: No tenderness. Abdominal:      General: Bowel sounds are normal. There is no distension. Palpations: Abdomen is soft. There is no mass. Tenderness: There is abdominal tenderness in the right upper quadrant and epigastric area. There is no rebound. Musculoskeletal:         General: Normal range of motion. Cervical back: Normal range of motion and neck supple. Comments: Good range of motion in all four extremities. Lymphadenopathy:      Cervical: No cervical adenopathy. Skin:     General: Skin is warm. Findings: No erythema or rash. Neurological:      Mental Status: He is alert and oriented to person, place, and time. Cranial Nerves: No cranial nerve deficit. Motor: No abnormal muscle tone. Deep Tendon Reflexes: Reflexes are normal and symmetric. Psychiatric:         Behavior: Behavior normal.         Thought Content:  Thought content normal.         Judgment: Judgment normal.         /66 (Site: Left Upper Arm, Position: Sitting, Cuff Size: Medium Adult)   Pulse 78   Resp 19   Ht 5' 9\" (1.753 m)   Wt 197 lb 12.8 oz (89.7 kg)   SpO2 96%   BMI 29.21 kg/m²      ECOG status is 1    Imaging studies directed.     Electronically signed by Jennifer Aburto MD on 5/6/21 at 12:43 PM EDT

## 2021-05-28 PROBLEM — R06.02 SHORTNESS OF BREATH: Status: ACTIVE | Noted: 2021-01-01

## 2021-05-28 NOTE — ED NOTES
Patient to ED from endoscopy. Patient was noted to have been short of breath and sent to ED for further eval. Patient has history of liver cancer. Patient is resting in bed with easy and unlabored respirations. Call light in reach. Side rails up x2. Patient denies further complaints or concerns. Will monitor.         Melquiades Howard, NII  05/28/21 [id number]

## 2021-05-28 NOTE — ED NOTES
Patient is resting in bed with easy and unlabored respirations. Call light in reach. Side rails up x2. Patient denies further complaints or concerns. Will monitor.         Yarelis Pizarro RN  05/28/21 [id number]

## 2021-05-28 NOTE — ED PROVIDER NOTES
Tonsillectomy; Cholecystectomy; Upper gastrointestinal endoscopy (N/A, 1/22/2021); Colonoscopy (4/2/2021); and Colonoscopy (4/2/2021). CURRENT MEDICATIONS       Current Discharge Medication List      CONTINUE these medications which have NOT CHANGED    Details   traMADol (ULTRAM) 50 MG tablet Take 50 mg by mouth every 6 hours as needed for Pain.      lovastatin (MEVACOR) 10 MG tablet Take 10 mg by mouth nightly      levothyroxine (SYNTHROID) 125 MCG tablet Take 125 mcg by mouth Daily      metoprolol tartrate (LOPRESSOR) 25 MG tablet Take 25 mg by mouth 2 times daily      omeprazole (PRILOSEC) 40 MG delayed release capsule Take 1 capsule by mouth daily 30-60\" before a meal (breakfast)  Qty: 30 capsule, Refills: 11      albuterol (ACCUNEB) 0.63 MG/3ML nebulizer solution Take 1 ampule by nebulization every 6 hours as needed for Wheezing             ALLERGIES     is allergic to aspirin. HISTORY     has no family status information on file. family history is not on file. SOCIALHISTORY      reports that he has been smoking. He has a 30.00 pack-year smoking history. He has never used smokeless tobacco. He reports current alcohol use. He reports that he does not use drugs. PHYSICAL EXAM     INITIAL VITALS:  weight is 184 lb (83.5 kg). His oral temperature is 97.8 °F (36.6 °C). His blood pressure is 124/58 (abnormal) and his pulse is 84. His respiration is 16 and oxygen saturation is 98%. Physical Exam  Vitals and nursing note reviewed. Constitutional:       Comments: Well Developed Well Nourished Appearing     HENT:      Head: Normocephalic and atraumatic. Eyes:      Pupils: Pupils are equal, round, and reactive to light. Cardiovascular:      Rate and Rhythm: Normal rate and regular rhythm. Heart sounds: Normal heart sounds. Pulmonary:      Effort: Pulmonary effort is normal. No respiratory distress. Breath sounds: Normal breath sounds. No wheezing.    Abdominal:      General: Bowel sounds are normal. There is no distension. Palpations: Abdomen is soft. Musculoskeletal:      Cervical back: Normal range of motion and neck supple. DIFFERENTIAL DIAGNOSIS:   Shortness of breath possible PE. We will do a CT of the chest.  We will also get a cardiac work-up and a BNP and a lactic acid. DIAGNOSTIC RESULTS     EKG: All EKG's are interpreted by the Emergency Department Physician who either signs or Co-signs this chart in the absence of a cardiologist.      RADIOLOGY: non-plain film images(s) such as CT, Ultrasound and MRI are read by the radiologist.  CT of the chest with IV contrast read per radiology      LABS:   Labs Reviewed   CBC WITH AUTO DIFFERENTIAL - Abnormal; Notable for the following components:       Result Value    WBC 25.5 (*)     RBC 4.05 (*)     Hemoglobin 12.0 (*)     Hematocrit 37.9 (*)     MCHC 31.7 (*)     RDW-CV 15.2 (*)     RDW-SD 51.4 (*)     Segs Absolute 19.4 (*)     Monocytes Absolute 1.5 (*)     All other components within normal limits   BASIC METABOLIC PANEL - Abnormal; Notable for the following components:    BUN 40 (*)     Calcium 11.8 (*)     All other components within normal limits   LIPASE - Abnormal; Notable for the following components:    Lipase 90.6 (*)     All other components within normal limits   HEPATIC FUNCTION PANEL - Abnormal; Notable for the following components:     Total Bilirubin 2.2 (*)     Bilirubin, Direct 1.6 (*)     Alkaline Phosphatase 743 (*)      (*)     All other components within normal limits   MAGNESIUM - Abnormal; Notable for the following components:    Magnesium 3.0 (*)     All other components within normal limits   BRAIN NATRIURETIC PEPTIDE - Abnormal; Notable for the following components:    Pro-BNP 2406.0 (*)     All other components within normal limits   GLOMERULAR FILTRATION RATE, ESTIMATED - Abnormal; Notable for the following components:    Est, Glom Filt Rate 78 (*)     All other components within normal limits COVID-19, RAPID   TROPONIN   LACTATE, SEPSIS   ANION GAP   OSMOLALITY   MANUAL DIFFERENTIAL   BASIC METABOLIC PANEL W/ REFLEX TO MG FOR LOW K   CBC WITH AUTO DIFFERENTIAL   PTH, INTACT   PTH-RELATED PEPTIDE   CALCIUM, IONIZED       EMERGENCY DEPARTMENT COURSE:   :    Vitals:    05/28/21 1314 05/28/21 1414 05/28/21 1544 05/28/21 1729   BP: 133/66 105/62 118/64 (!) 124/58   Pulse: 78 67 84 84   Resp: 21 20 18 16   Temp: 97.7 °F (36.5 °C)   97.8 °F (36.6 °C)   TempSrc: Oral   Oral   SpO2: 96% 98% 95% 98%   Weight: 197 lb 12.8 oz (89.7 kg)   184 lb (83.5 kg)     Patient was seen history physical exam was performed. The patient was given morphine and fentanyl. The patient was given Zofran IV. Case was staffed with Dr. Yessica Valladares will admit. See disposition below    CRITICAL CARE:  None    CONSULTS:  Hospitalist service accepted    PROCEDURES:  None    FINAL IMPRESSION      1. Elevated bilirubin    2. Leukocytosis, unspecified type          DISPOSITION/PLAN   Admit    PATIENT REFERRED TO:  No follow-up provider specified.     DISCHARGE MEDICATIONS:  Current Discharge Medication List          (Please note that portions of this note were completed with a voice recognitionprogram.  Efforts were made to edit the dictations but occasionally words are mis-transcribed.)    MIKAEL [address]  05/28/21 2037

## 2021-05-28 NOTE — H&P
HISTORY & PHYSICAL       Patient:  River Singleton  YOB: 1965    MRN: [id number]     Acct: [de-identified]    PCP: NORMA Bailey NP    Date of Admission: 5/28/2021    Date of Service: Pt seen/examined on 5/28/2021 and Admitted to Inpatient with expected LOS greater than two midnights due to medical therapy. ASSESSMENT:    Active Hospital Problems    Diagnosis Date Noted    Shortness of breath [R06.02] 05/28/2021       PLAN:     Dyspnea  -Likely secondary to lung metastasis; however, does have elevated WBC so need to consider infectious etiology vs. Elevated Pro-BNP but does not have clinical signs of fluid overload vs. Covid  -CTA negative for PE  -Will obtain rapid Covid and Echo  -Titrate O2 for SpO2 > 92%    Metastatic cancer of unknown primary source  -CT chest: Right hilar adenopathy and lung metastases   -CT Abd/Pelv: extensive hepatic metastasis and right kidney mass, suspicious for malignancy  -Diffuse bony metastases noted on imaging  -Has not yet had tissue diagnosis - scheduled 6/7/21 for CT biopsy liver  -Consult pulmonology for possible bronch? Hypercalcemia  -Likely due to diffuse body metastases  -Will hydrate with IVF and obtain PTH / PTHrP    Elevated BNP  -Does not have evidence of fluid overload, but likely due to lung pathology  -Will obtain Echo and monitor for signs/symptoms of volume overload    Elevated LFTs  -Likely secondary to metastatic liver lesions  -Avoid hepatotoxic agents.  Trend CMP    Leukocytosis  -Likely secondary to known malignancy  -Does not have evidence of infection - no fever, tachycardia, elevated lactic  -Trend CBC    Normocytic Anemia  -Etiology unclear as no signs/symptoms of active bleeding  -Trend CBC    Abdominal Aortic Aneurysm  -CT abd/pelvis noted 3.5 cm infrarenal AAA  -Serial monitoring as outpatient    Essential Hypertension  -BP stable  -Continue Lopressor    Hyperlipidemia  -Lipid panel 4/30/21: cholesterol 254/ HDL 43 / LDL 182 / Triglycerides 146  -Continue Lipitor    GERD  -Continue Protonix    Hypothyroidism  -Continue Synthroid    Malnutrition  -13 lb weight loss due to known malignancy  -Consult dietician for recommendations    Consult palliative care    Chief Complaint:  Shortness of breath      History Of Present Illness:   49 y.o. male who presented to [address] [place] [address] with shortness of breath. Patient has history of metastatic cancer of unknown origin and was at the endoscopy center for EGD today; however, he was short of breath at rest and the procedure was canceled and he was sent to the ED for further evaluation. He states the shortness of breath has been ongoing for the last month or so, but worsening over the last two weeks. It is associated with dry cough and palpitations. He denies fevers, chills, or chest pain. He also is endorsing generalized abdominal pain, bilateral hip and back pain and states \"I have cancer all over. \"    While he was vaccinated for COVID-19, states he had his last shot 5/21/21. In the ED, CTA was performed to rule out PE. Pro-BNP was elevated, but patient denies previous history of heart failure and does not exhibit signs of volume overload.      Past Medical History:          Diagnosis Date    Arthritis     Cancer ([address]     [facility] [facility]     GERD (gastroesophageal reflux disease)     Hyperlipidemia     Hypertension     Thyroid disease        Past Surgical History:          Procedure Laterality Date    CHOLECYSTECTOMY      COLONOSCOPY      COLONOSCOPY  4/2/2021    COLONOSCOPY WITH BIOPSY performed by Marcy Orellana MD at [facility]    COLONOSCOPY  4/2/2021    COLONOSCOPY POLYPECTOMY SNARE/COLD BIOPSY performed by Marcy Orellana MD at [facility]    ENDOSCOPY, COLON, DIAGNOSTIC      FRACTURE SURGERY Right     foot    TONSILLECTOMY      UPPER GASTROINTESTINAL ENDOSCOPY N/A 1/22/2021    EGD BIOPSY performed by Carine Martinez MD Cindy at [facility]       Medications Prior to Admission:      Prior to Admission medications    Medication Sig Start Date End Date Taking? Authorizing Provider   traMADol (ULTRAM) 50 MG tablet Take 50 mg by mouth every 6 hours as needed for Pain. Historical Provider, MD   albuterol (ACCUNEB) 0.63 MG/3ML nebulizer solution Take 1 ampule by nebulization every 6 hours as needed for Wheezing    Historical Provider, MD   lovastatin (MEVACOR) 10 MG tablet Take 10 mg by mouth nightly    Historical Provider, MD   levothyroxine (SYNTHROID) 125 MCG tablet Take 125 mcg by mouth Daily    Historical Provider, MD   metoprolol tartrate (LOPRESSOR) 25 MG tablet Take 25 mg by mouth 2 times daily    Historical Provider, MD   omeprazole (PRILOSEC) 40 MG delayed release capsule Take 1 capsule by mouth daily 30-60\" before a meal (breakfast) 1/22/21   Socrates Bonilla MD       Allergies:  Aspirin    Social History:      The patient currently lives at home    TOBACCO:   reports that he has been smoking. He has a 30.00 pack-year smoking history. He has never used smokeless tobacco.  ETOH:   reports current alcohol use. Family History:      Reviewed in detail and negative for DM, CAD, Cancer, CVA. Positive as follows:    History reviewed. No pertinent family history. Diet:  DIET GENERAL;    Review of Systems   Constitutional: Positive for activity change, appetite change and fatigue. Negative for chills and fever. HENT: Negative for congestion, rhinorrhea and sore throat. Eyes: Negative for visual disturbance. Respiratory: Positive for cough and shortness of breath. Cardiovascular: Positive for palpitations. Negative for chest pain. Gastrointestinal: Positive for abdominal pain. Negative for constipation, diarrhea, nausea and vomiting. Genitourinary: Negative for dysuria, frequency and urgency. Musculoskeletal: Positive for arthralgias, back pain and neck pain. Skin: Negative for rash. Vitiligo     Neurological: Positive for weakness. Negative for dizziness, light-headedness, numbness and headaches. Psychiatric/Behavioral: Negative for sleep disturbance. The patient is not nervous/anxious. PHYSICAL EXAM:    BP (!) 124/58   Pulse 84   Temp 97.8 °F (36.6 °C) (Oral)   Resp 16   Wt 184 lb (83.5 kg)   SpO2 98%   BMI 27.17 kg/m²     Physical Exam  Vitals reviewed. Constitutional:       General: He is not in acute distress. Appearance: Normal appearance. HENT:      Head: Normocephalic and atraumatic. Right Ear: External ear normal.      Left Ear: External ear normal.      Nose: Nose normal.      Mouth/Throat:      Mouth: Mucous membranes are moist.   Eyes:      Conjunctiva/sclera: Conjunctivae normal.   Cardiovascular:      Rate and Rhythm: Normal rate and regular rhythm. Pulses: Normal pulses. Heart sounds: No murmur heard. Pulmonary:      Effort: Pulmonary effort is normal. No respiratory distress. Breath sounds: Normal breath sounds. No wheezing, rhonchi or rales. Abdominal:      General: Abdomen is flat. Bowel sounds are normal. There is distension. Tenderness: There is generalized abdominal tenderness. There is no guarding. Musculoskeletal:      Cervical back: Normal range of motion. Right lower leg: No edema. Left lower leg: No edema. Skin:     General: Skin is warm and dry. Capillary Refill: Capillary refill takes less than 2 seconds. Comments: Vitiligo to b/l UE   Neurological:      General: No focal deficit present. Mental Status: He is alert.    Psychiatric:         Mood and Affect: Mood normal.         Behavior: Behavior normal.           Labs:     Recent Labs     05/28/21  1313   WBC 25.5*   HGB 12.0*   HCT 37.9*        Recent Labs     05/28/21  1313      K 4.2   CL 99   CO2 25   BUN 40*   CREATININE 1.0   CALCIUM 11.8*     Recent Labs     05/28/21  1313   *   ALT 47   BILIDIR 1.6* BILITOT 2.2*   ALKPHOS 743*     No results for input(s): INR in the last 72 hours. No results for input(s): Everrett Calderon in the last 72 hours. Urinalysis:    No results found for: NITRU, WBCUA, BACTERIA, RBCUA, BLOODU, SPECGRAV, GLUCOSEU    Intake & Output:  No intake/output data recorded. No intake/output data recorded. Radiology:       CTA CHEST W WO CONTRAST   Final Result   1. No PE   2. Right hilar adenopathy and lung metastases as detailed above. 3. Osseous lytic metastases detailed above. **This report has been created using voice recognition software. It may contain minor errors which are inherent in voice recognition technology. **      Final report electronically signed by Dr. Noah Lemus on 5/28/2021 3:20 PM      CT ABDOMEN PELVIS W IV CONTRAST Additional Contrast? Radiologist Recommendation   Final Result   1. Extensive hepatic metastases   2. 2 separate soft tissue nodules posterior to cava possibly lymphadenopathy or metastases   3. Ill-defined lesion right kidney seen on MRI as well. Suspicious for malignancy. 3.5 cm infrarenal abdominal aortic aneurysm. Osseous metastases. **This report has been created using voice recognition software. It may contain minor errors which are inherent in voice recognition technology. **      Final report electronically signed by Dr. Noah Lemus on 5/28/2021 3:30 PM               DVT prophylaxis: [x] Lovenox                                 [] SCDs                                 [] SQ Heparin                                 [] Encourage ambulation           [] Already on Anticoagulation    Code Status: Full Code      PT/OT Eval Status: pending    Disposition:    [x] Home       [] TCU       [] Rehab       [] Psych       [] SNF       [] [place]       [] Other-        Thank you NORMA Inman - [facility] for the opportunity to be involved in this patient's care.     Electronically signed by Daryle Derby, MD on 5/28/2021 at 6:30 PM

## 2021-05-29 NOTE — PLAN OF CARE
Discussed MRI preliminary read with Dr. Michael Adams, radiologist multiple intra-axial lesions concerning for metastases, some with hemorrhagic component - states MRI correlates with CT of head from earlier today. No active hemorrhage. Will update night team.   Oncology has been consulted.

## 2021-05-29 NOTE — PLAN OF CARE
Problem: Falls - Risk of:  Goal: Will remain free from falls  Description: Will remain free from falls  5/29/2021 0230 by Marquez Armendariz RN  Outcome: Met This Shift  5/28/2021 2008 by Eliza Last RN  Outcome: Ongoing  Goal: Absence of physical injury  Description: Absence of physical injury  5/29/2021 0230 by Marquez Armendariz RN  Outcome: Met This Shift  5/28/2021 2008 by Eliza Last RN  Outcome: Ongoing     Problem: Skin Integrity:  Goal: Will show no infection signs and symptoms  Description: Will show no infection signs and symptoms  5/29/2021 0230 by Marquez Armendariz RN  Outcome: Met This Shift  5/28/2021 2008 by Eliza Last RN  Outcome: Ongoing  Goal: Absence of new skin breakdown  Description: Absence of new skin breakdown  5/29/2021 0230 by Marquez Armendariz RN  Outcome: Met This Shift  5/28/2021 2008 by Eliza Last RN  Outcome: Ongoing     Problem: Pain:  Goal: Control of acute pain  Description: Control of acute pain  5/29/2021 0230 by Marquez Armendairz RN  Outcome: Met This Shift  5/28/2021 2008 by Eliza Last RN  Outcome: Ongoing     Problem: Breathing Pattern - Ineffective:  Goal: Ability to achieve and maintain a regular respiratory rate will improve  Description: Ability to achieve and maintain a regular respiratory rate will improve  5/29/2021 0230 by Marquez Armendariz RN  Outcome: Met This Shift  5/28/2021 2008 by Eliza Last RN  Outcome: Ongoing     Problem: Pain:  Goal: Pain level will decrease  Description: Pain level will decrease  5/29/2021 0230 by Marquez Armendariz RN  Outcome: Ongoing  5/28/2021 2008 by Eliza Last RN  Outcome: Ongoing  Goal: Control of chronic pain  Description: Control of chronic pain  5/29/2021 0230 by Marquez Armendariz RN  Outcome: Ongoing  5/28/2021 2008 by Eliza Last RN  Outcome: Ongoing

## 2021-05-29 NOTE — CONSULTS
[facility]    Patient - Terry Mullen   MRN -  [id number]   Lalito # - [de-identified]   - 1965      Date of Admission -  2021 12:58 PM  Date of evaluation -  2021  Room - -008-A   Hospital Day - 1  Consulting - Federico Noel MD Primary Care Physician - NORMA Hoffmann - NP   Chief Complaint   Shortness of breath   Active Hospital Problem List      Active Hospital Problems    Diagnosis Date Noted    Dyspnea [R06.00]     Hypercalcemia [E83.52]     Metastatic malignant neoplasm ([address] [C79.9]     Protein-calorie malnutrition ([address] [E46]     Shortness of breath [R06.02] 2021     HPI     49 y. o. male with past medical history of HTN and COPD who presented to [place] [address] with shortness of breath. Patient has history of metastatic cancer of unknown origin and was at the endoscopy center for EGD today; however it was not completed due to shortness of breath. CT chest done showed right middle lobe lobulated mass and liver mets. CT of the head done 21 to evaluate for metastasis. After CT head revealed \"Multiple intra-axial lesions concerning for metastases, some with hemorrhagic component,\" went in to discuss results with patient prior to MRI. He is c/ogenralized abdominal and back pain. Patient is a chronic smoker, he smoked 1 PPD for 30 years, He worked as a .      Past Medical History         Diagnosis Date    Arthritis     Cancer ([address]     COPD (chronic obstructive pulmonary disease) (HCC)     GERD (gastroesophageal reflux disease)     Hyperlipidemia     Hypertension     Thyroid disease       Past Surgical History           Procedure Laterality Date    CHOLECYSTECTOMY      COLONOSCOPY      COLONOSCOPY  2021    COLONOSCOPY WITH BIOPSY performed by [facility]aley [facility]ks [facility] at [facility]    COLONOSCOPY  2021    COLONOSCOPY POLYPECTOMY SNARE/COLD BIOPSY performed by Haley Sparks MD at [facility]    ENDOSCOPY, COLON, DIAGNOSTIC      FRACTURE SURGERY Right     foot    TONSILLECTOMY      UPPER GASTROINTESTINAL ENDOSCOPY N/A 1/22/2021    EGD BIOPSY performed by Heaven Santos MD at [facility]     Diet    Diet NPO, After Midnight Exceptions are: Sips of Water with Meds  Allergies    Aspirin  Social History     Social History     Socioeconomic History    Marital status:      Spouse name: Not on file    Number of children: Not on file    Years of education: Not on file    Highest education level: Not on file   Occupational History    Not on file   Tobacco Use    Smoking status: Current Every Day Smoker     Packs/day: 1.00     Years: 30.00     Pack years: 30.00    Smokeless tobacco: Never Used   Substance and Sexual Activity    Alcohol use: Yes     Comment: 3-4 times/ week beer    Drug use: Never    Sexual activity: Not on file   Other Topics Concern    Not on file   Social History Narrative    Not on file     Social Determinants of Health     Financial Resource Strain:     Difficulty of Paying Living Expenses:    Food Insecurity:     Worried About Running Out of Food in the Last Year:     [place] of Food in the Last Year:    Transportation Needs:     Lack of Transportation (Medical):  Lack of Transportation (Non-Medical):    Physical Activity:     Days of Exercise per Week:     Minutes of Exercise per Session:    Stress:     Feeling of Stress :    Social Connections:     Frequency of Communication with Friends and Family:     Frequency of Social Gatherings with Friends and Family:     Attends Jew Services:     Active Member of Clubs or Organizations:     Attends Club or Organization Meetings:     Marital Status:    Intimate Partner Violence:     Fear of Current or Ex-Partner:     Emotionally Abused:     Physically Abused:     Sexually Abused:      Family History    History reviewed. No pertinent family history.   Sleep History    No history ROS    General/Constitutional: No recent loss of weight or appetite changes. No fever or chills. HENT: Negative. Eyes: Negative. Upper respiratory tract: No nasal stuffiness or post nasal drip. Lower respiratory tract/ lungs: No cough or sputum production. No hemoptysis. Cardiovascular: No palpitations, chest pain or edema. Gastrointestinal: Abdominal pain   Neurological: Back pain   Extremities: No tenderness. Musculoskeletal: Back pain   Genitourinary: No complaints. Hematological: Negative. Denies easy buising  Skin: No itching. Meds    Current Medications    sodium chloride flush  5-40 mL Intravenous 2 times per day    levothyroxine  125 mcg Oral Daily    atorvastatin  10 mg Oral Daily    metoprolol tartrate  25 mg Oral BID    pantoprazole  40 mg Oral QAM AC     morphine, sodium chloride flush, sodium chloride, promethazine **OR** ondansetron, polyethylene glycol, acetaminophen **OR** acetaminophen, albuterol, HYDROcodone 5 mg - acetaminophen  IV Drips/Infusions   sodium chloride      sodium chloride 150 mL/hr at 05/29/21 1651     Vitals    Vitals    weight is 184 lb (83.5 kg). His axillary temperature is 97 °F (36.1 °C). His blood pressure is 120/58 (abnormal) and his pulse is 81. His respiration is 18 and oxygen saturation is 92%. I/O    Intake/Output Summary (Last 24 hours) at 5/29/2021 1811  Last data filed at 5/29/2021 1702  Gross per 24 hour   Intake 2864.1 ml   Output 1275 ml   Net 1589.1 ml     Patient Vitals for the past 96 hrs (Last 3 readings):   Weight   05/28/21 1729 184 lb (83.5 kg)   05/28/21 1314 197 lb 12.8 oz (89.7 kg)     Exam   Constitutional: Patient appears moderately built and moderately nourished. Head: Normocephalic and atraumatic. Mouth/Throat: Oropharynx is clear and moist.  No oral thrush. Eyes: Conjunctivae are normal. Pupils are equal, round, and reactive to light. No scleral icterus. Neck: Neck supple. No JVD or tracheal deviation present. Cardiovascular: Regular rate, regular rhythm, S1 and S2 with no murmur. No peripheral edema  Pulmonary/Chest: Normal effort with clear breath sounds. No stridor. No respiratory distress. Abdominal: Distended abdomen, generalized tenderness, decreased sounds   Musculoskeletal: Moves all extremities  Lymphadenopathy:  No cervical adenopathy. Neurological: Patient is alert and oriented to person, place, and time. Skin: Skin is warm and dry. Labs   ABG  No results found for: PH, PO2, PCO2, HCO3, O2SAT  No results found for: IFIO2, MODE, SETTIDVOL, SETPEEP  CBC  Recent Labs     05/28/21  1313 05/29/21  0519   WBC 25.5* 24.0*   RBC 4.05* 3.64*   HGB 12.0* 10.5*   HCT 37.9* 34.7*   MCV 93.6 95.3*   MCH 29.6 28.8   MCHC 31.7* 30.3*    152   MPV 10.0 9.7      BMP  Recent Labs     05/28/21  1313 05/29/21  0519    137   K 4.2 4.7   CL 99 101   CO2 25 24   BUN 40* 36*   CREATININE 1.0 1.1   GLUCOSE 100 105   MG 3.0*  --    CALCIUM 11.8* 11.0*     LFT  Recent Labs     05/28/21  1313   *   ALT 47   BILITOT 2.2*   ALKPHOS 743*   LIPASE 90.6*     TROP  Lab Results   Component Value Date    TROPONINT < 0.010 05/28/2021     BNP  Lab Results   Component Value Date    PROBNP 2406.0 05/28/2021     PFTs   None   Echo    None  Cultures    Procalcitonin  No results found for: [facility]    Radiology    CXR    CT Scans          Assessment   Metastatic carcinoma, suspect lung origin  Hypercalcemia secondary to bone mets  Transaminates  Leukocytosis  Anemia  Hypothyroidism  Recommendations     Patient will need biopsy, can do bronchoscopy/EBUS on Tuesday vs IR guided liver biopsy if can be done on Monday  Pain control  Bronchodilators prn  Oncology consult  IV hydration per primary for hypercalcemia  DVT and GI prophylaxis     Thank you for the consult and allowing us to participate in the care of your patient. Case discussed with nurse and patient/family. Questions and concerns addressed.   Meds and Orders
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
## 2021-05-30 PROBLEM — E43 SEVERE MALNUTRITION (HCC): Status: ACTIVE | Noted: 2021-01-01

## 2021-05-30 NOTE — FLOWSHEET NOTE
05/30/21 1655   Provider Notification   Reason for Communication Review case   Provider Name Dr Trevor Dos Santos   Provider Notification Physician   Method of Communication Secure Message   Response Waiting for response   Notification Time [phone]      multiple mets, lungs, liver, bone and brain

## 2021-05-30 NOTE — PROGRESS NOTES
performed by Chasity Green MD at [facility]    COLONOSCOPY  4/2/2021    COLONOSCOPY POLYPECTOMY SNARE/COLD BIOPSY performed by Chasity Green MD at [facility]    ENDOSCOPY, COLON, DIAGNOSTIC      FRACTURE SURGERY Right     foot    TONSILLECTOMY      UPPER GASTROINTESTINAL ENDOSCOPY N/A 1/22/2021    EGD BIOPSY performed by Chasity Green MD at [address] [place] [address];  Dietary Nutrition Supplements: Standard High Calorie Oral Supplement  Allergies    Aspirin  Social History     Social History     Socioeconomic History    Marital status:      Spouse name: Not on file    Number of children: Not on file    Years of education: Not on file    Highest education level: Not on file   Occupational History    Not on file   Tobacco Use    Smoking status: Current Every Day Smoker     Packs/day: 1.00     Years: 30.00     Pack years: 30.00    Smokeless tobacco: Never Used   Substance and Sexual Activity    Alcohol use: Yes     Comment: 3-4 times/ week beer    Drug use: Never    Sexual activity: Not on file   Other Topics Concern    Not on file   Social History Narrative    Not on file     Social Determinants of Health     Financial Resource Strain:     Difficulty of Paying Living Expenses:    Food Insecurity:     Worried About Running Out of Food in the Last Year:     Ran Out of Food in the Last Year:    Transportation Needs:     Lack of Transportation (Medical):      Lack of Transportation (Non-Medical):    Physical Activity:     Days of Exercise per Week:     Minutes of Exercise per Session:    Stress:     Feeling of Stress :    Social Connections:     Frequency of Communication with Friends and Family:     Frequency of Social Gatherings with Friends and Family:     Attends [employer]ist Services:     Active Member of Clubs or Organizations:     Attends Club or Organization Meetings:     Marital Status:    Intimate Partner Violence:     Fear of Current or Ex-Partner:     Emotionally Abused:     Physically Abused:     Sexually Abused:      Family History    History reviewed. No pertinent family history. Sleep History    No history   Meds    Current Medications    levETIRAcetam  500 mg Oral BID    dexamethasone  4 mg Oral 3 times per day    docusate sodium  100 mg Oral BID    morphine  15 mg Oral 2 times per day    sodium chloride flush  5-40 mL Intravenous 2 times per day    levothyroxine  125 mcg Oral Daily    [Held by provider] atorvastatin  10 mg Oral Daily    metoprolol tartrate  25 mg Oral BID    pantoprazole  40 mg Oral QAM AC     oxyCODONE, sodium chloride flush, sodium chloride, promethazine **OR** ondansetron, polyethylene glycol, acetaminophen **OR** acetaminophen, albuterol  IV Drips/Infusions   sodium chloride      sodium chloride 150 mL/hr at 05/30/21 1451     Vitals    Vitals    height is 5' 9\" (1.753 m) and weight is 184 lb (83.5 kg). His axillary temperature is 98.6 °F (37 °C). His blood pressure is 112/56 (abnormal) and his pulse is 69. His respiration is 19 and oxygen saturation is 91%. O2 Flow Rate (L/min): 2 L/min  I/O    Intake/Output Summary (Last 24 hours) at 5/30/2021 1704  Last data filed at 5/30/2021 1545  Gross per 24 hour   Intake 3260.06 ml   Output 1200 ml   Net 2060.06 ml     Patient Vitals for the past 96 hrs (Last 3 readings):   Weight   05/28/21 1729 184 lb (83.5 kg)   05/28/21 1314 197 lb 12.8 oz (89.7 kg)     Exam   Constitutional: Patient appears moderately built and moderately nourished. Head: Normocephalic and atraumatic. Mouth/Throat: Oropharynx is clear and moist.  No oral thrush. Eyes: Conjunctivae are normal. Pupils are equal, round, and reactive to light. No scleral icterus. Neck: Neck supple. No JVD or tracheal deviation present. Cardiovascular: Regular rate, regular rhythm, S1 and S2 with no murmur. No peripheral edema  Pulmonary/Chest: Normal effort with clear breath sounds. No stridor.  No respiratory distress. Abdominal: Distended abdomen, generalized tenderness, decreased sounds   Musculoskeletal: Moves all extremities  Lymphadenopathy:  No cervical adenopathy. Neurological: Patient is alert and oriented to person, place, and time. Skin: Skin is warm and dry.       Labs   ABG  No results found for: PH, PO2, PCO2, HCO3, O2SAT  No results found for: IFIO2, MODE, SETTIDVOL, SETPEEP  CBC  Recent Labs     05/28/21  1313 05/29/21  0519 05/30/21  0518   WBC 25.5* 24.0* 24.0*   RBC 4.05* 3.64* 3.37*   HGB 12.0* 10.5* 9.8*   HCT 37.9* 34.7* 32.4*   MCV 93.6 95.3* 96.1*   MCH 29.6 28.8 29.1   MCHC 31.7* 30.3* 30.2*    152 147   MPV 10.0 9.7 10.1      BMP  Recent Labs     05/28/21  1313 05/29/21  1949 05/30/21  0518 05/30/21  1459    135 138 138   K 4.2 4.0 4.5 4.4   CL 99 101 105 105   CO2 25 23 22* 23   BUN 40* 34* 36* 36*   CREATININE 1.0 1.2 1.3* 1.3*   GLUCOSE 100 103 100 106   MG 3.0*  --   --   --    CALCIUM 11.8* 10.7* 10.7* 10.6*     LFT  Recent Labs     05/28/21  1313 05/30/21  1459   * 154*   ALT 47 52   BILITOT 2.2* 2.1*   ALKPHOS 743* 760*   LIPASE 90.6*  --      TROP  Lab Results   Component Value Date    TROPONINT < 0.010 05/28/2021     BNP  Lab Results   Component Value Date    PROBNP 2406.0 05/28/2021     PFTs   None   Echo    None  Cultures    Procalcitonin  No results found for: [facility]    Radiology    CXR    CT Scans          Assessment   Metastatic carcinoma, suspect lung origin  Hypercalcemia secondary to bone mets  Transaminates  Leukocytosis  Anemia  Hypothyroidism  Recommendations     Patient will need biopsy, can do bronchoscopy/EBUS on Tuesday vs IR guided liver biopsy, discussed with Dr. Kobe Pena   Bronchodilators prn  Oncology and palliative consult  IV hydration per primary for hypercalcemia  DVT and GI prophylaxis       Electronically signed by     Carmen Davidson MD on 5/30/2021 at 5:04 PM

## 2021-05-30 NOTE — PLAN OF CARE
Problem: Falls - Risk of:  Goal: Will remain free from falls  Description: Will remain free from falls  5/30/2021 0213 by Jailene Winston RN  Outcome: Met This Shift  5/29/2021 1404 by Dede Iglesias RN  Outcome: Ongoing  Note: No falls this shift, call light in reach. Bed alarm on. Goal: Absence of physical injury  Description: Absence of physical injury  Outcome: Met This Shift     Problem: Skin Integrity:  Goal: Will show no infection signs and symptoms  Description: Will show no infection signs and symptoms  Outcome: Met This Shift  Goal: Absence of new skin breakdown  Description: Absence of new skin breakdown  5/30/2021 0213 by Jailene Winston RN  Outcome: Met This Shift  5/29/2021 1404 by Dede Iglesias RN  Outcome: Ongoing  Note: No new skin breakdown noted, pt repositions self frequently     Problem: Pain:  Goal: Pain level will decrease  Description: Pain level will decrease  5/30/2021 0213 by Jailene Winston RN  Outcome: Met This Shift  5/29/2021 1404 by Dede Iglesias RN  Outcome: Ongoing  Note: Complains of back and hip pain rated 8-9/10, pain goal 4. PRN morphine being given. Pain management consulted. Goal: Control of acute pain  Description: Control of acute pain  Outcome: Met This Shift  Goal: Control of chronic pain  Description: Control of chronic pain  Outcome: Met This Shift     Problem: Breathing Pattern - Ineffective:  Goal: Ability to achieve and maintain a regular respiratory rate will improve  Description: Ability to achieve and maintain a regular respiratory rate will improve  5/30/2021 0213 by Jailene Winston RN  Outcome: Met This Shift  5/29/2021 1404 by Ddee Iglesias RN  Outcome: Ongoing  Note: Respirations easy and unlabored. Remains on room air.  Pulmonary to see     Problem: Infection:  Goal: Will remain free from infection  Description: Will remain free from infection  5/30/2021 0213 by Jailene Winston RN  Outcome: Met This Shift  5/29/2021 1404 by Dede Iglesias RN  Outcome: Ongoing  Note: Afebrile this shift     Problem: Daily Care:  Goal: Daily care needs are met  Description: Daily care needs are met  Outcome: Met This Shift     Problem: Discharge Planning:  Goal: Patients continuum of care needs are met  Description: Patients continuum of care needs are met  5/30/2021 0213 by Renu Campbell RN  Outcome: Met This Shift  5/29/2021 1404 by Theresa Snider RN  Outcome: Ongoing  Note: Pt is homeless
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
## 2021-05-30 NOTE — PLAN OF CARE
Problem: Falls - Risk of:  Goal: Will remain free from falls  Description: Will remain free from falls  5/30/2021 1420 by Claribel Hernandez RN  Outcome: Ongoing  Note: No falls this shift, call light in reach. Bed alarm on. Problem: Skin Integrity:  Goal: Absence of new skin breakdown  Description: Absence of new skin breakdown  5/30/2021 1420 by Claribel Hernandez RN  Outcome: Ongoing  Note: No new skin breakdown noted, pt turns and repositions self frequently     Problem: Pain:  Goal: Pain level will decrease  Description: Pain level will decrease  5/30/2021 1420 by Claribel Hernandez RN  Outcome: Ongoing  Note: Complains of pain rated 8/10, pain goal 3. PRN norco and morphine given, scheduled morphine given. 5/30/2021 0213 by Biju Ocononr RN  Outcome: Met This Shift     Problem: Breathing Pattern - Ineffective:  Goal: Ability to achieve and maintain a regular respiratory rate will improve  Description: Ability to achieve and maintain a regular respiratory rate will improve  5/30/2021 1420 by Claribel Hernandez RN  Outcome: Ongoing  Note: Respirations easy and unlabored. Pulmonary following. Bronchoscopy planned for Tuesday     Problem: Infection:  Goal: Will remain free from infection  Description: Will remain free from infection  5/30/2021 1420 by Claribel Hernandez RN  Outcome: Ongoing  Note: Afebrile this shift     Problem: Discharge Planning:  Goal: Patients continuum of care needs are met  Description: Patients continuum of care needs are met  5/30/2021 1420 by Claribel Hernandez RN  Outcome: Ongoing  Note: Unsure of discharge plans at this time. Care plan reviewed with patient. Patient verbalize understanding of the plan of care and contribute to goal setting.

## 2021-05-30 NOTE — PROGRESS NOTES
Nutrition Therapies:    DIET GENERAL;  Dietary Nutrition Supplements: Standard High Calorie Oral Supplement TID    Anthropometric Measures:  · Height: 5' 9\" (175.3 cm) (per old EMR chart)  · Current Body Weight: 184 lb (83.5 kg) (5/28 with no edema)   · Admission Body Weight: 184 lb (83.5 kg) (5/28 with no edema)    · Usual Body Weight: 197 lb (89.4 kg) (5/6/21; 198# 4/2/21; 195# 1/22/21)     · Ideal Body Weight: 160 lbs;   · % Ideal Body Weight 115 %   · BMI: 27.2  · Adjusted Body Weight:  ; No Adjustment   · BMI Categories: Overweight (BMI 25.0-29. 9)       Nutrition Diagnosis:   · Severe malnutrition, In context of acute illness or injury related to inadequate protein-energy intake (new CA) as evidenced by poor intake prior to admission, weight loss, moderate muscle loss      Nutrition Interventions:   Food and/or Nutrient Delivery:  Continue Current Diet, Start Oral Nutrition Supplement  Nutrition Education/Counseling:  Education initiated (encouraged po at best effort and ONS)   Coordination of Nutrition Care:  Continue to monitor while inpatient    Goals:  consume greater than 75% meals and ONS during LOS       Nutrition Monitoring and Evaluation:   Behavioral-Environmental Outcomes:  None Identified   Food/Nutrient Intake Outcomes:  Food and Nutrient Intake, Supplement Intake  Physical Signs/Symptoms Outcomes:  Biochemical Data, GI Status, Fluid Status or Edema, Hemodynamic Status, Skin, Weight, Nutrition Focused Physical Findings     Discharge Planning:     Too soon to determine     Electronically signed by Da Norris RD, LD on 5/30/21 at 3:49 PM EDT    Contact: [phone]

## 2021-05-30 NOTE — CONSULTS
Radiation Oncology Inpatient Consultation  Encounter Date: 2021     Mr. Atif Collins is a 49 y.o. male  : 1965  MRN: [id number]  Acct Number: [de-identified]  Requesting Provider: Dr. Greer Cash     Reason for request: Brain metastases      CONSULTANT: Musa Mcfarlane MD      DIAGNOSIS: C79.31 -- Secondary malignant neoplasm of brain, related to:  C80.0 -- Disseminated malignant neoplasm, unspecified (possible renal origin; currently without pathologic diagnosis)        ASSESSMENT:  Widely disseminated metastatic malignancy from an as yet unidentified source (possibly kidney), with intracranial metastatic disease. Recommendations as previously noted per medical oncology are biopsy for histologic diagnosis. The mainstay of treatment would then be systemic therapy. For the brain metastases, the recommendation is for palliative whole brain radiation therapy. PLAN:  Schedule CT simulation  Schedule nursing teaching  Plan to initiate radiation therapy Tuesday, 2021  Continue care in medical oncology, and with other physicians/providers        HISTORY OF PRESENT ILLNESS:   Eliz Castaneda is a 56-year-old gentleman who initially presented in late  with complaints of melena following several months of vague intermittent upper abdominal discomfort. He was evaluated in gastroenterology and underwent upper and lower endoscopy. Visually, diffuse gastritis was present; multiple biopsies were performed, showing focal intestinal metaplasia but no evidence of Helicobacter pylori, or malignancy (see pathology below). CT scan of the abdomen was performed due to persistent complaints of abdominal discomfort. This was read as showing evidence of metastatic disease in the liver. He was seen for medical oncology consultation, with recommendation for CT-guided biopsy and additional imaging studies.   MRI scan of the abdomen redemonstrated the liver metastases, and also showed an abnormality in the right kidney suspicious for malignancy. Sebas Garcia presented to the emergency department 5/28/2021 complaining of dyspnea. CT angiography did not show any evidence of pulmonary embolism, but did show evidence of lung metastases and thoracic lymphadenopathy; laboratory studies showed hypercalcemia. Sebas Garcia was admitted to the hospital and the admission H&P was found to have some confusion. .  CT scan of the head showed evidence of multiple brain lesions some with evidence of subacute hemorrhage, later confirmed on MRI scan. Sebas Garcia is being seen for evaluation and discussion regarding the role of radiation therapy in the management of his intracranial metastatic disease. Past Medical History:   Diagnosis Date    Arthritis     Cancer ([address]     COPD (chronic obstructive pulmonary disease) ([address]     GERD (gastroesophageal reflux disease)     Hyperlipidemia     Hypertension     Thyroid disease         Past Surgical History:   Procedure Laterality Date    CHOLECYSTECTOMY      COLONOSCOPY      COLONOSCOPY  4/2/2021    COLONOSCOPY WITH BIOPSY performed by Devang Singh MD at [facility]    COLONOSCOPY  4/2/2021    COLONOSCOPY POLYPECTOMY SNARE/COLD BIOPSY performed by Devang Singh MD at [facility]    ENDOSCOPY, COLON, DIAGNOSTIC      FRACTURE SURGERY Right     foot    TONSILLECTOMY      UPPER GASTROINTESTINAL ENDOSCOPY N/A 1/22/2021    EGD BIOPSY performed by Devang iSngh MD at [facility]       History reviewed. No pertinent family history.     Social History     Socioeconomic History    Marital status:      Spouse name: Not on file    Number of children: Not on file    Years of education: Not on file    Highest education level: Not on file   Occupational History    Not on file   Tobacco Use    Smoking status: Current Every Day Smoker     Packs/day: 1.00     Years: 30.00     Pack years: 30.00    Smokeless tobacco: Never Used   Substance and Sexual Activity    Alcohol use: Yes     Comment: 3-4 times/ week beer    Drug use: Never    Sexual activity: Not on file   Other Topics Concern    Not on file   Social History Narrative    Not on file     Social Determinants of Health     Financial Resource Strain:     Difficulty of Paying Living Expenses:    Food Insecurity:     Worried About Running Out of Food in the Last Year:     [address] in the Last Year:    Transportation Needs:     Lack of Transportation (Medical):      Lack of Transportation (Non-Medical):    Physical Activity:     Days of Exercise per Week:     Minutes of Exercise per Session:    Stress:     Feeling of Stress :    Social Connections:     Frequency of Communication with Friends and Family:     Frequency of Social Gatherings with Friends and Family:     Attends [employer] Services:     Active Member of Clubs or Organizations:     Attends Club or Organization Meetings:     Marital Status:    Intimate Partner Violence:     Fear of Current or Ex-Partner:     Emotionally Abused:     Physically Abused:     Sexually Abused:      Exposure to Industrial/ environmental Carcinogens: no      ALLERGIES:   Allergies   Allergen Reactions    Aspirin      Rye syndrome            Current Facility-Administered Medications   Medication Dose Route Frequency Provider Last Rate Last Admin    levETIRAcetam (KEPPRA) tablet 500 mg  500 mg Oral BID Suzi Burdick MD        dexamethasone (DECADRON) tablet 4 mg  4 mg Oral 3 times per day Suzi Burdick MD   4 mg at 05/30/21 1658    oxyCODONE (ROXICODONE) immediate release tablet 5 mg  5 mg Oral Q4H PRN Suzi Burdick MD        docusate sodium (COLACE) capsule 100 mg  100 mg Oral BID Suzi Burdick MD        morphine (MS CONTIN) extended release tablet 15 mg  15 mg Oral 2 times per day Sofia Sheets MD   15 mg at 05/30/21 0308    sodium chloride flush 0.9 % injection 5-40 mL  5-40 mL Intravenous 2 times per day Praveen Bowser MD        sodium chloride flush 0.9 % injection 5-40 mL  5-40 mL Intravenous PRN Sil Zhao MD        0.9 % sodium chloride infusion  25 mL Intravenous PRN Sil Zhao MD        promethazine (PHENERGAN) tablet 12.5 mg  12.5 mg Oral Q6H PRN Sil Zhao MD        Or    ondansetron [facility]) injection 4 mg  4 mg Intravenous Q6H PRN Sil Zhao MD        polyethylene glycol [facility]) packet 17 g  17 g Oral Daily PRN Sil Zhao MD        acetaminophen (TYLENOL) tablet 650 mg  650 mg Oral Q6H PRN Sil Zhao MD        Or    acetaminophen (TYLENOL) suppository 650 mg  650 mg Rectal Q6H PRN Sil Zhao MD        albuterol (PROVENTIL) nebulizer solution 2.5 mg  2.5 mg Nebulization Q6H PRN Sil Zhao MD        levothyroxine (SYNTHROID) tablet 125 mcg  125 mcg Oral Daily Sil Zhao MD   125 mcg at 05/30/21 0610    [Held by provider] atorvastatin (LIPITOR) tablet 10 mg  10 mg Oral Daily Sil Zhao MD   10 mg at 05/29/21 2123    metoprolol tartrate (LOPRESSOR) tablet 25 mg  25 mg Oral BID Sil Zhao MD   25 mg at 05/30/21 0639    pantoprazole (PROTONIX) tablet 40 mg  40 mg Oral QAM AC Sil Zhao MD   40 mg at 05/30/21 0610    0.9 % sodium chloride infusion   Intravenous Continuous Sil Zhao  mL/hr at 05/30/21 1451 New Bag at 05/30/21 1451     Outpatient Medications Marked as Taking for the 5/28/21 encounter Psychiatric Encounter)   Medication Sig Dispense Refill    traMADol (ULTRAM) 50 MG tablet Take 50 mg by mouth every 6 hours as needed for Pain.       lovastatin (MEVACOR) 10 MG tablet Take 10 mg by mouth nightly      levothyroxine (SYNTHROID) 125 MCG tablet Take 125 mcg by mouth Daily      metoprolol tartrate (LOPRESSOR) 25 MG tablet Take 25 mg by mouth 2 times daily      omeprazole (PRILOSEC) 40 MG delayed release capsule Take 1 capsule by mouth daily 30-60\" before a meal (breakfast) 30 capsule 11          LABORATORY STUDIES:    CBC:   Lab Results   Component Value Date    WBC 24.0 05/30/2021    RBC 3.37 05/30/2021    HGB 9.8 05/30/2021    HCT 32.4 05/30/2021    MCV 96.1 05/30/2021    MCH 29.1 05/30/2021    MCHC 30.2 05/30/2021    RDW 12.6 04/30/2021     05/30/2021    MPV 10.1 81/42/4616     MetabolicPanel:  Lab Results   Component Value Date     05/30/2021    K 4.4 05/30/2021    K 4.7 05/29/2021     05/30/2021    CO2 23 05/30/2021    BUN 36 05/30/2021    CREATININE 1.3 05/30/2021    GFRAA >60 04/30/2021    LABGLOM 57 05/30/2021    GLUCOSE 106 05/30/2021    PROT 6.1 05/30/2021    LABALBU 3.4 05/30/2021    CALCIUM 10.6 05/30/2021    BILITOT 2.1 05/30/2021    ALKPHOS 760 05/30/2021     05/30/2021    ALT 52 05/30/2021     Onc labs:   Lab Results   Component Value Date    PSA 3.14 07/10/2019    CEA 6.9 05/06/2021         PATHOLOGY:   1/22/2021: Surgical pathology:  FINAL DIAGNOSIS:   A. Duodenum, normal appearing, biopsy:    Normal villiform small bowel mucosa. B.  Gastric antrum, biopsy:    Focal intestinal metaplasia, congestion and patchy inflammation. Negative for Helicobacter. 4/2/2021: Surgical pathology:  FINAL DIAGNOSIS:   A: Terminal ileum, normal-appearing, biopsies:    Small intestinal mucosa with no significant pathologic findings. B: Ascending colon, normal-appearing, biopsies:    Colonic mucosa with no significant pathologic findings. Negative for active colitis and microscopic colitis. C: Cecal polyps x2, biopsies:    Sessile serrated adenomas/polyps. D: Ascending colon polyps x2, biopsies:    Tubular adenoma and sessile serrated adenoma/polyp. E: Descending colon, biopsies:    Colonic mucosa with no significant pathologic findings. Negative for active colitis and microscopic colitis. F: Descending colon polyps x2, biopsies:    Tubular adenoma. Hyperplastic polyp. G: Sigmoid colon polyps x4, biopsies:    Hyperplastic polyps. RADIOLOGIC STUDIES:   5/14/2021: MRI abdomen with/without contrast:  Impression   1.  Diffuse metastatic disease in [as per intake evaluation]:  Constitutional: Denies fever, chills. Complains of increased weakness and fatigue, unintentional weight loss due to decreased appetite and abdominal discomfort. HEENT: Denies hearing or vision change. Denies dysphagia or odynophagia. Respiratory: complains of dyspnea on exertion and at rest, nonproductive cough. Cardiovascular: Denies hest pain. GI: Denies nausea or vomiting; Complains of intermittent abdominal pain andrecent episodes of melena/hematochezia. : Denies hematuria or dysuria. Musculoskeletal: multiple sites of pain including back and neck  Extremities: Maintains extremity ROM  Metabolic/endocrine: denies new complaints  Neurological: generalized weakness. Denies seizures, fainting or tremors. Integument: Denies rashes or ulcerations. PHYSICAL STATUS:   VITAL SIGNS: BP (!) 112/56   Pulse 69   Temp 98.6 °F (37 °C) (Axillary)   Resp 19   Ht 5' 9\" (1.753 m) Comment: per old EMR chart  Wt 184 lb (83.5 kg)   SpO2 91%   BMI 27.17 kg/m²   ECOG PERFORMANCE STATUS: 1  PAIN RATIN-3/10 (back)  GENERAL: Well-developed adult male. Currently sleeping. SKIN: Warm and dry, without jaundice, ecchymoses, or petechiae. HEENT: Normocephalic, atraumatic. ears, nose and lips unremarkable   NECK:  No JVD;   CARDIAC: nontachycardic  EXTREMITIES: no clubbing or cyanosis  NEUROLOGIC: [patient asleep]        Thank you for allowing my assistance in [facility] care. Cheng Forte MD   Radiation Oncology       CC: Yahaira Cochran/[facility]  ACC: Tumor Registry:  [facility]      This document was created using a voice-recognition program.  Computer generated transcription errors may be present.

## 2021-05-30 NOTE — CONSULTS
[facility]  CANCER NETWORK OF [facility]  [facility]  [facility] [address] [facility]  [address] [facility] [address] [facility] [address]  Dept: [phone]  Dept Fax: [phone]  Loc: [phone]    Date: 05/30/2021    Patient:  Hector Collins     Diagnosis: Radiographic evidence of diffuse metastatic disease including central nervous system metastasis, but no tissue diagnosis has been established to date. Assessment/Recommendations: Patient has previously been seen by Dr. Josafat Tolliver and a CT-guided liver biopsy was scheduled to obtain tissue diagnosis. I would recommend obtaining a CT-guided liver biopsy as soon as possible to establish tissue diagnosis and therefore treatment options can be discussed with the patient. As noted in Dr. Jerardo Cheema note, all forms of therapy would be palliative in nature. I would recommend radiation oncology consultation for treatment of central nervous system metastasis. Treatment for CNS disease could be started without establishing clear tissue diagnosis. Continue Decadron for edema related to central nervous system metastases. Systemic therapy would require tissue diagnosis for establish appropriate treatment recommendations. Barry Enamorado M.D. Medical Director: [facility]  Cancer Network [facility]  [address], [address] [place] [address], [address] [place] [address], [address] [place] [address] of the [facility] at the [facility]      **This report has been created using voice recognition software. It may contain minor errors which are inherent in voice recognition technology. **

## 2021-05-31 NOTE — PLAN OF CARE
Problem: Falls - Risk of:  Goal: Will remain free from falls  Description: Will remain free from falls  Outcome: Ongoing  Note: Patient free from falls. Wears non-skid footwear while out of bed. Call light in reach at all times. Alarm on while in bed and while in chair. Hourly rounding continued. Goal: Absence of physical injury  Description: Absence of physical injury  Outcome: Ongoing     Problem: Skin Integrity:  Goal: Will show no infection signs and symptoms  Description: Will show no infection signs and symptoms  Outcome: Ongoing  Goal: Absence of new skin breakdown  Description: Absence of new skin breakdown  Outcome: Ongoing     Problem: Pain:  Goal: Pain level will decrease  Description: Pain level will decrease  Outcome: Ongoing  Note: Moans and grunts often, unable to rate pain but when asked if he is in pain, states yes. Administered scheduled pain medication and PRN medications per orders. Slept well after pain medication administration. Problem: Breathing Pattern - Ineffective:  Goal: Ability to achieve and maintain a regular respiratory rate will improve  Description: Ability to achieve and maintain a regular respiratory rate will improve  Outcome: Ongoing  Note: Currently wears O2 at 2L nasal  cannula, often has on his cheeks or off completely. Education of need of nasal cannula continues. Problem: Discharge Planning:  Goal: Patients continuum of care needs are met  Description: Patients continuum of care needs are met  Outcome: Ongoing  Note: Discharge planning continues as patient lives with elderly mother, which will not be able to care for patient upon discharge. Care plan reviewed with patient. Patient verbalizes understanding of the plan of care and contributed to goal setting.

## 2021-05-31 NOTE — FLOWSHEET NOTE
05/31/21 1122   Provider Notification   Reason for Communication Review case   Provider Name Dr Kassandra Townsend   Provider Notification Physician   Method of Communication Secure Message   Response No new orders   Notification Time [phone]     Updated provider CT is unable to complete liver biopsy today due to [facility] Day holiday.

## 2021-05-31 NOTE — PLAN OF CARE
Care:  Goal: Daily care needs are met  Description: Daily care needs are met  Outcome: Ongoing     Problem: Discharge Planning:  Goal: Patients continuum of care needs are met  Description: Patients continuum of care needs are met  5/30/2021 2226 by Erika Lamb RN  Outcome: Ongoing  5/30/2021 1420 by Le Bain RN  Outcome: Ongoing  Note: Unsure of discharge plans at this time.      Problem: Nutrition  Goal: Optimal nutrition therapy  5/30/2021 2226 by Erika Lamb RN  Outcome: Ongoing  5/30/2021 1549 by Latoya Wheatley RD, LD  Outcome: Ongoing
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
## 2021-05-31 NOTE — PROGRESS NOTES
pulmonary disease) ([facility] [address]     GERD (gastroesophageal reflux disease)     Hyperlipidemia     Hypertension     Thyroid disease       Past Surgical History           Procedure Laterality Date    CHOLECYSTECTOMY      COLONOSCOPY      COLONOSCOPY  4/2/2021    COLONOSCOPY WITH BIOPSY performed by Tisha Gatse MD at [facility]    COLONOSCOPY  4/2/2021    COLONOSCOPY POLYPECTOMY SNARE/COLD BIOPSY performed by Tisha Gates MD at [facility]    ENDOSCOPY, COLON, DIAGNOSTIC      FRACTURE SURGERY Right     foot    TONSILLECTOMY      UPPER GASTROINTESTINAL ENDOSCOPY N/A 1/22/2021    EGD BIOPSY performed by Tisha Gates MD at [address] [place] [address];  Dietary Nutrition Supplements: Standard High Calorie Oral Supplement  Allergies    Aspirin  Social History     Social History     Socioeconomic History    Marital status:      Spouse name: Not on file    Number of children: Not on file    Years of education: Not on file    Highest education level: Not on file   Occupational History    Not on file   Tobacco Use    Smoking status: Current Every Day Smoker     Packs/day: 1.00     Years: 30.00     Pack years: 30.00    Smokeless tobacco: Never Used   Substance and Sexual Activity    Alcohol use: Yes     Comment: 3-4 times/ week beer    Drug use: Never    Sexual activity: Not on file   Other Topics Concern    Not on file   Social History Narrative    Not on file     Social Determinants of Health     Financial Resource Strain:     Difficulty of Paying Living Expenses:    Food Insecurity:     Worried About Running Out of Food in the Last Year:     Ran Out of Food in the Last Year:    Transportation Needs:     Lack of Transportation (Medical):      Lack of Transportation (Non-Medical):    Physical Activity:     Days of Exercise per Week:     Minutes of Exercise per Session:    Stress:     Feeling of Stress :    Social Connections:     Frequency of Communication with Friends and Family:     Frequency of Social Gatherings with Friends and Family:     Attends Mu-ism Services:     Active Member of Clubs or Organizations:     Attends Club or Organization Meetings:     Marital Status:    Intimate Partner Violence:     Fear of Current or Ex-Partner:     Emotionally Abused:     Physically Abused:     Sexually Abused:      Family History    History reviewed. No pertinent family history. Sleep History    No history   Meds    Current Medications    levETIRAcetam  500 mg Oral BID    dexamethasone  4 mg Oral 3 times per day    docusate sodium  100 mg Oral BID    morphine  15 mg Oral 2 times per day    sodium chloride flush  5-40 mL Intravenous 2 times per day    levothyroxine  125 mcg Oral Daily    [Held by provider] atorvastatin  10 mg Oral Daily    metoprolol tartrate  25 mg Oral BID    pantoprazole  40 mg Oral QAM AC     oxyCODONE **OR** oxyCODONE, sodium chloride flush, sodium chloride, promethazine **OR** ondansetron, polyethylene glycol, acetaminophen **OR** acetaminophen, albuterol  IV Drips/Infusions   sodium chloride      sodium chloride 150 mL/hr at 05/30/21 1451     Vitals    Vitals    height is 5' 9\" (1.753 m) and weight is 184 lb (83.5 kg). His axillary temperature is 98.1 °F (36.7 °C). His blood pressure is 122/61 and his pulse is 69. His respiration is 19 and oxygen saturation is 92%. O2 Flow Rate (L/min): 2 L/min  I/O    Intake/Output Summary (Last 24 hours) at 5/31/2021 1407  Last data filed at 5/31/2021 1137  Gross per 24 hour   Intake 1745.6 ml   Output 850 ml   Net 895.6 ml     Patient Vitals for the past 96 hrs (Last 3 readings):   Weight   05/28/21 1729 184 lb (83.5 kg)   05/28/21 1314 197 lb 12.8 oz (89.7 kg)     Exam   Constitutional: Patient appears in no distress. Mouth/Throat: Oropharynx is clear and moist.  No oral thrush. Neck: Neck supple. Cardiovascular: S1 and S2 heard. No murmurs.    Pulmonary/Chest: Bilateral air entry present. Good breath sounds on both sides, diminished at bases. No wheezes. No rales. Abdominal: Soft. No tenderness. Hepatomegaly noted  Extremities: Patient exhibits no edema. Neurological: Patient is awake and alert. Labs   ABG  No results found for: PH, PO2, PCO2, HCO3, O2SAT  No results found for: Lynda Cram, SETPEEP  CBC  Recent Labs     05/29/21  0519 05/30/21  0518 05/31/21  0449   WBC 24.0* 24.0* 26.5*   RBC 3.64* 3.37* 3.18*   HGB 10.5* 9.8* 9.4*   HCT 34.7* 32.4* 31.5*   MCV 95.3* 96.1* 99.1*   MCH 28.8 29.1 29.6   MCHC 30.3* 30.2* 29.8*    147 139   MPV 9.7 10.1 10.8      BMP  Recent Labs     05/30/21  0518 05/30/21  1459 05/31/21  0449    138 140   K 4.5 4.4 4.6    105 105   CO2 22* 23 20*   BUN 36* 36* 37*   CREATININE 1.3* 1.3* 1.0   GLUCOSE 100 106 116*   CALCIUM 10.7* 10.6* 10.6*     LFT  Recent Labs     05/30/21  1459 05/31/21  0449   * 144*   ALT 52 50   BILITOT 2.1* 2.3*   ALKPHOS 760* 793*     TROP  Lab Results   Component Value Date    TROPONINT < 0.010 05/28/2021     BNP  Lab Results   Component Value Date    PROBNP 2406.0 05/28/2021     PFTs   None   Echo    None  Cultures    Procalcitonin  No results found for: [facility]    Radiology    CT Scans          Assessment   -Metastatic carcinoma, suspect lung origin-is currently waiting for CT-guided biopsy of the liver  -Multiple smaller ring-enhancing lesions present throughout the cerebral hemispheres bilaterally consistent with history of metastatic disease.  -Chronic history of tobacco smoking for 30 years-high suspicion for COPD  -Hypercalcemia secondary to bone mets-improving  -Elevated liver function tests-due to metastatic lesion  -Anemia-stable hemoglobin and hematocrit  -Hypothyroidism on supplementation  Recommendations   -Patient currently waiting for CT-guided biopsy of the liver by interventional radiology.   We will proceed  -Wean FiO2 keep saturations more than 90%  -Continue albuterol 2.5 mg by nebulization every 6 hourly as needed bronchodilators prn  -Medical oncology consult by Dr. Loretta Calabrese MD appreciated  -Continue IV hydration per primary service for hypercalcemia  -DVT prophylaxis per primary-anticoagulation is on hold due to abnormal MRI scan of the brain with subacute intracranial hemorrhage lesions present both infratentorially and supratentorially       Electronically signed by   Tera Allison MD on 5/31/2021 at 2:07 PM

## 2021-05-31 NOTE — PROGRESS NOTES
Patient has had lot of pain this shift, as evidenced by grunting and yelling out. Will yell out \"come on, come on, I can't take it anymore\". When asked via this nurse if he is in pain, he states yes. Administered PRN pain medications x2 for break through pain as well as scheduled MS Contin. Increased confusion noted, as well. Urinated in water cup versus urinal and had call light in mouth stating it was an apple. Alert to name only.

## 2021-06-01 NOTE — PLAN OF CARE
pain but when asked if he is in pain, states yes. Administered scheduled pain medication and PRN medications per orders. Slept well after pain medication administration. Goal: Control of acute pain  Description: Control of acute pain  Outcome: Ongoing  Note: No complaint of pain voiced at this time. Continue to monitor. PRN medications available if needed. Goal: Control of chronic pain  Description: Control of chronic pain  Outcome: Ongoing  Note: No complaint of pain voiced at this time. Continue to monitor. PRN medications available if needed. Problem: Breathing Pattern - Ineffective:  Goal: Ability to achieve and maintain a regular respiratory rate will improve  Description: Ability to achieve and maintain a regular respiratory rate will improve  5/31/2021 2040 by Gina Carbajal RN  Outcome: Ongoing  Note: Patient is maintaining a regular respiratory rate. Will continue to monitor. 5/31/2021 1441 by Promise Riddle RN  Outcome: Ongoing  Note: Currently wears O2 at 2L nasal  cannula, often has on his cheeks or off completely. Education of need of nasal cannula continues. Problem: Infection:  Goal: Will remain free from infection  Description: Will remain free from infection  Outcome: Ongoing  Note: No signs of infection noted yet will continue to monitor throughout the shift. Problem: Daily Care:  Goal: Daily care needs are met  Description: Daily care needs are met  Outcome: Ongoing  Note: Patient is being assisted with ADL's by nursing assistant staff. Will follow up. Problem: Discharge Planning:  Goal: Patients continuum of care needs are met  Description: Patients continuum of care needs are met  5/31/2021 2040 by Gina Carbajal RN  Outcome: Ongoing  Note: Patient's needs are being met and will be evaluated and carried out upon discharge.   5/31/2021 1441 by Promise Riddle RN  Outcome: Ongoing  Note: Discharge planning continues as patient lives with elderly mother, which will not be able to care for patient upon discharge. Problem: Nutrition  Goal: Optimal nutrition therapy  Outcome: Ongoing  Note: Patient is NPO this night a midnight. Patient did eat some dinner.
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
## 2021-06-01 NOTE — PROGRESS NOTES
[facility]    Patient - Lane Reyes   MRN -  [id number]   [facility] # - [de-identified]   - 1965      Date of Admission -  2021 12:58 PM  Date of evaluation -  2021  Room - DANITZA--A   Hospital Day - 4  Consulting - Nikos Patten MD Primary Care Physician - Gt Villanueva, NORMA - NP       Reason for Consult    Metastatic cancer of unknown origin  Brain mets with hemorrhagic component  Known pt to our office  Follows with Dr. Vianca Duarte, seen as new pt 2021  [address] Problems    Diagnosis Date Noted    Severe malnutrition ([address] Enriqueta Benitez 2021     Class: Acute    Dyspnea [R06.00]     Hypercalcemia [E83.52]     Metastatic malignant neoplasm ([address] [C79.9]     Protein-calorie malnutrition ([address] [E46]     Metastasis to brain ([address] [C79.31]     Subacute intracranial hemorrhage ([address] [I62.9]     Shortness of breath [R06.02] 2021     HPI/Subjective   Lane Reyes is a 49 y.o. male admitted for SOB. Pt was at endoscopy center for EGD, procedure cancelled d/t SOB at rest & sent to ED for further evaluation. Progressive SOB reported by pt, worsening x 2 weeks. Dry cough, heart palpitations, generalized abd pain, bilateral hip/back pain also reported. Pt had recent COVID-19 vaccination 2021. WBC 25.5. LFTs elevated. CTA chest (-) PE, (+) R hilar adenopathy & lung metastases, osseous lytic metastases. CTA Abd/Pelvis (+) extensive hepatic metastases, ill-defined lesion R kidney, suspicious for malignancy; 3.5 cm infrarenal abd aortic aneurysm; osseous mets. CT head (+) multiple intra-axial lesions concerning for metastases, some with hemorrhagic componenet. MRI brain (+) several well-contained areas of subacute intracranial hemorrhage lesions present both infratentorially and supratentorially, measuring up to 2.8 cm in R cerebellar hemisphere-no surrounding edema.   Multiple smaller ring-enhancing lesions present t/o cerebral hemispheres bilaterally consistent with Hx metastatic disease-no associated edema or midline shift. CT-guided liver biopsy scheduled for 6/2/2021 at 8:30 am.  Radiation Oncology on case for palliative radiation options. Chronic smoker, hx 1 ppd x 30 years. 6/1/2021:  Pt resting in bed, family not present. Pt reports pain \"all over\" and nausea. Pt currently NPO d/t aspirating. Pt not A & O, can tell me his name only. Pt having a hard time articulating, speech garbled at times. Visibly SOB, on 1 lpm at 92%. Pt has productive cough. Pt answers \"yes\" to many questions, some do not make sense. Palliative care on board, to meet with family later today.       Oncology History   Metastatic cancer from unknown origin, liver biopsy scheduled 6/2/2021   Meds    Current Medications    levETIRAcetam  500 mg Oral BID    dexamethasone  4 mg Oral 3 times per day    docusate sodium  100 mg Oral BID    [Held by provider] morphine  15 mg Oral 2 times per day    sodium chloride flush  5-40 mL Intravenous 2 times per day    levothyroxine  125 mcg Oral Daily    [Held by provider] atorvastatin  10 mg Oral Daily    metoprolol tartrate  25 mg Oral BID    pantoprazole  40 mg Oral QAM AC     morphine, [Held by provider] oxyCODONE **OR** [Held by provider] oxyCODONE, sodium chloride flush, sodium chloride, promethazine **OR** ondansetron, polyethylene glycol, acetaminophen **OR** acetaminophen, albuterol  IV Drips/Infusions   sodium chloride      sodium chloride 150 mL/hr at 05/30/21 1451     Past Medical History         Diagnosis Date    Arthritis     Cancer [facility])     COPD ([facility]ronic obstructive pulmonary disease) [address] [address] [facility]     GERD (gastroesophageal reflux disease)     Hyperlipidemia     Hypertension     Thyroid disease       Past Surgical History           Procedure Laterality Date    CHOLECYSTECTOMY      COLONOSCOPY      COLONOSCOPY  4/2/2021    COLONOSCOPY WITH BIOPSY performed by Yvette Carlisle MD [facility]    COLONOSCOPY  4/2/2021    COLONOSCOPY POLYPECTOMY SNARE/COLD BIOPSY performed by Marah Day MD at [facility]    ENDOSCOPY, COLON, DIAGNOSTIC      FRACTURE SURGERY Right     foot    TONSILLECTOMY      UPPER GASTROINTESTINAL ENDOSCOPY N/A 1/22/2021    EGD BIOPSY performed by Marah Day MD at [facility]     Diet    Diet NPO Effective Now Exceptions are: Other (Specify); Specify Other: no exceptions/ aspiration risk  Allergies    Aspirin  Social History     Social History     Socioeconomic History    Marital status:      Spouse name: Not on file    Number of children: Not on file    Years of education: Not on file    Highest education level: Not on file   Occupational History    Not on file   Tobacco Use    Smoking status: Current Every Day Smoker     Packs/day: 1.00     Years: 30.00     Pack years: 30.00    Smokeless tobacco: Never Used   Substance and Sexual Activity    Alcohol use: Yes     Comment: 3-4 times/ week beer    Drug use: Never    Sexual activity: Not on file   Other Topics Concern    Not on file   Social History Narrative    Not on file     Social Determinants of Health     Financial Resource Strain:     Difficulty of Paying Living Expenses:    Food Insecurity:     Worried About Running Out of Food in the Last Year:     Ran Out of Food in the Last Year:    Transportation Needs:     Lack of Transportation (Medical):      Lack of Transportation (Non-Medical):    Physical Activity:     Days of Exercise per Week:     Minutes of Exercise per Session:    Stress:     Feeling of Stress :    Social Connections:     Frequency of Communication with Friends and Family:     Frequency of Social Gatherings with Friends and Family:     Attends Alevism Services:     Active Member of Clubs or Organizations:     Attends Club or Organization Meetings:     Marital Status:    Intimate Partner Violence:     Fear of Current or LFT  Recent Labs     05/30/21  1459 05/31/21  0449   * 144*   ALT 52 50   BILITOT 2.1* 2.3*   ALKPHOS 760* 793*     INR  No results for input(s): INR, PROTIME in the last 72 hours. PTT  No results for input(s): APTT in the last 72 hours. Radiology        ECHO Complete 2D W Doppler W Color    Result Date: 5/30/2021  Transthoracic Echocardiography Report (TTE)  Demographics   Patient Name   Beverley Burgos    Gender              Male                 Taylor Torre   MR #           [id number]         Race                                                    Ethnicity   Account #      [de-identified]         Room Number         0008   Accession      [id number]        Date of Study       05/29/2021  Number   Date of Birth  1965        Referring Physician Sydni Fleming MD   Age            49 year(s)        Sonographer         Ross Guevara MD                                   Physician  Procedure Type of Study   TTE procedure:ECHOCARDIOGRAM COMPLETE 2D W DOPPLER W COLOR. Procedure Date Date: 05/29/2021 Start: 10:16 AM Study Location: Bedside Technical Quality: Adequate visualization Indications:Elevated BNP and Dyspnea / Shortness of breath. Additional Medical History:Smoker, hypertension, hyperlipidemia, GERD, hypothyroidism Patient Status: Routine Height: 69 inches Weight: 184 pounds BSA: 1.99 m^2 BMI: 27.17 kg/m^2 BP: 120/57 mmHg  Conclusions   Summary  Ejection fraction was estimated at 55-60%. There was mild tricuspid regurgitation. Assuming RAP = 5 mmHg, the  estimated RVSP = 41 mmHg. IVC size is within normal limits with normal respiratory phasic changes.    Signature   ----------------------------------------------------------------  Electronically signed by Terrell Palomino MD (Interpreting  physician) on 05/30/2021 at 08:34 AM  ----------------------------------------------------------------   Findings   Mitral Valve  The mitral Septum         Index: 41 ml/21 m^2       RV Diastolic Dimension: 2.5 cm  Diastolic: 1   EF Calculated: 57.9 %  cm                                       LA/Aorta: 1.06                                            LA volume/Index: 40.9 ml /21m^2  Doppler Measurements & Calculations   MV Peak E-Wave: 126 cm/s   AV Peak Velocity: 176  LVOT Peak Velocity: 136  MV Peak A-Wave: 89.7 cm/s  cm/s                   cm/s  MV E/A Ratio: 1.4          AV Peak Gradient:      LVOT Peak Gradient: 7  MV Peak Gradient: 6.35     12.39 mmHg             mmHg  mmHg                                                    TV Peak E-Wave: 71.1  MV Deceleration Time: 236                         cm/s  msec                                              TV Peak A-Wave: 45 cm/s  MV P1/2t: 69 msec          IVRT: 77 msec  MVA by PHT:3.19 cm^2                              TV Peak Gradient: 2.02                                                    mmHg  MV E' Septal Velocity: 8.7 AV DVI (Vmax):0.77     TR Velocity:246 cm/s  cm/s                                              TR Gradient:24.21 mmHg  MV A' Septal Velocity: 12                         PV Peak Velocity: 75.8  cm/s                                              cm/s  MV E' Lateral Velocity:                           PV Peak Gradient: 2.3  7.8 cm/s                                          mmHg  MV A' Lateral Velocity:  13.5 cm/s  E/E' septal: 14.48  E/E' lateral: 16.15  [email or web address] [id number]    CT HEAD WO CONTRAST    Result Date: 5/29/2021  PROCEDURE: CT HEAD WO CONTRAST CLINICAL INFORMATION: metastatic cancer, some confusion. COMPARISON: No prior study. TECHNIQUE: Noncontrast 5 mm axial images were obtained through the brain. Sagittal and coronal reconstructions were created.  All CT scans at this facility use dose modulation, iterative reconstruction, and/or weight-based dosing 1.1 x 2.1 cm Upper abdomen Please see the same-day dedicated exam. Bones Negative lesions are seen in the C7 vertebral body T2 vertebral body probably T9 and T8 vertebral bodies subtle lytic lesion lateral aspect left third rib large lytic lesion lateral aspect right sixth rib     1. No PE 2. Right hilar adenopathy and lung metastases as detailed above. 3. Osseous lytic metastases detailed above. **This report has been created using voice recognition software. It may contain minor errors which are inherent in voice recognition technology. ** Final report electronically signed by Dr. Tyson Nunez on 5/28/2021 3:20 PM    CT ABDOMEN PELVIS W IV CONTRAST Additional Contrast? Radiologist Recommendation    Result Date: 5/28/2021  PROCEDURE: CT ABDOMEN PELVIS W IV CONTRAST CLINICAL INFORMATION: Abdominal pain Elevated Bilirubin 2.2 (new) P . COMPARISON: 4/25/2021 [facility] CT TECHNIQUE: 2-D multiplanar postcontrast images of the abdomen and pelvis All CT scans at this facility use dose modulation, iterative reconstruction, and/or weight-based dosing when appropriate to reduce radiation dose to as low as reasonably achievable. FINDINGS: Previous CT scan noncontrast liver is not exactly comparable but there are extensive hepatic metastases probably not significantly changed. There is no ductal dilatation seen. Prior cholecystectomy. Spleen is normal. Ill-defined mass right kidney measures 2.2 cm. This is seen on prior MRI. Nodular fullness of the left adrenal gland. This is stable compared to the prior exam. Nonenlarged lymph node posterior to the cava. 8 mm soft tissue nodule posterior to the cava. No other retroperitoneal or central mesenteric lymphadenopathy seen. 3.5 cm infrarenal abdominal aortic aneurysm. Pelvis Uncomplicated diverticulosis. Mild prostamegaly. No bowel obstruction. Normal appendix.  Lytic lesion lateral body L1 Lung bases Please see the same-day dedicated exam.     1. Extensive hepatic metastases 2. 2 separate soft tissue nodules posterior to cava possibly lymphadenopathy or metastases 3. Ill-defined lesion right kidney seen on MRI as well. Suspicious for malignancy. 3.5 cm infrarenal abdominal aortic aneurysm. Osseous metastases. **This report has been created using voice recognition software. It may contain minor errors which are inherent in voice recognition technology. ** Final report electronically signed by Dr. Andrei Rogers on 5/28/2021 3:30 PM    MRI ABDOMEN W WO CONTRAST MRCP    Result Date: 5/14/2021  EXAMINATION: MRI SCAN OF THE ABDOMEN W WO CONTRAST MRCP. HISTORY: 47-year-old patient with known liver cancer. Abdominal pain. Back pain. TECHNIQUE: MRI scan was carried out through the abdomen before and after intravenous administration of 20 mL of ProHance. MRCP was performed. COMPARISON: CT scan of the abdomen dated 25 April 2021 FINDINGS: Lung bases: Right paraspinal mass measuring 2.4 x 2.4 cm in size, unchanged since previous CT scan dated 25 April 2021. Liver: Multiple nodules throughout the right and left lobes of the liver suggestive of metastatic disease. The largest lesion in the right lobe of liver posteriorly measures 4.6 x 4.3 cm in size and in the left lobe of the liver measures 4.1 x 6 cm in size. These lesions are new since remote CT scan dated 24 September 2018, approximately unchanged since recent CT scan of the abdomen dated 25 April 2021. Gallbladder/biliary system. Status post cholecystectomy. The common hepatic duct measures up to 12 mm in diameter. The common bile duct measures 3.7 mm in diameter. There is no evidence of choledocholithiasis. Spleen: Normal. Adrenal glands. Mild fullness of the left adrenal gland. There is signal loss on the  out of phase images consistent with a benign process. The right adrenal gland is normal. Pancreas: Unremarkable. The pancreatic duct is not dilated. The fat planes surrounding the pancreas are preserved.  Kidneys: Malrotated left 5/29/2021 FINDINGS: Multiple T2/flair hyperintense T1 isointense, mildly diffusion restricting ring-enhancing lesions present within the cerebral hemispheres bilaterally. No significant surrounding edema. For example, Left occipital lobe ring-enhancing lesion measuring 7 mm (series 14, image 12). Left frontal lobe ring-enhancing lesion measuring 5 mm imprint series 14, image 18). Right frontal lobe ring-enhancing lesion measuring 4 mm (series 14, image 20) Right parietal lobe ring-enhancing lesion measuring 5 mm (series 14, image 18). Right temporal lobe ring-enhancing lesion measuring 3 mm (series 14, image 8). Additionally there are T1/T2 hyperintense nonenhancing lesions present within the right cerebellar hemisphere (measuring up to 2.8 cm), left temporal lobe (measuring up to 2.5 cm), right frontal lobe (measuring up to 9 mm) and right parietal lobe (measuring up to 7 mm). There is minimal gradient blooming within several of these lesions including the right parietal lobe and right cerebellar hemisphere suggesting these represent subacute hemorrhage. No significant surrounding edema. No extra-axial fluid collection. No hydrocephalus. Basilar cisterns are patent. 4th ventricle is patent. No brainstem abnormality identified. Intracranial flow voids are patent. The visualized paranasal sinuses and mastoid air-cells are clear. 1.  Several well-contained areas of subacute intracranial hemorrhage lesions present both infratentorially and supratentorially, measuring up to 2.8 cm in the right cerebellar hemisphere. No surrounding edema. 2.  Multiple smaller ring-enhancing lesions present throughout the cerebral hemispheres bilaterally consistent with history of metastatic disease. No associated edema or midline shift. This document has been electronically signed by: Mike Pearson MD on 05/29/2021 06:29 PM     XR COMPARISON OF OUTSIDE FILMS    Result Date: 5/27/2021  Radiology exam is complete.  No Radiologist dictation. Please follow up with ordering provider. Assessment/Recommendations    1. Metastatic cancer from unknown origin -  CTA chest (-) PE, (+) R hilar adenopathy & lung metastases, osseous lytic metastases. CTA Abd/Pelvis (+) extensive hepatic metastases, ill-defined lesion R kidney, suspicious for malignancy; 3.5 cm infrarenal abd aortic aneurysm; osseous mets. CT head (+) multiple intra-axial lesions concerning for metastases, some with hemorrhagic componenet. MRI brain (+) several well-contained areas of subacute intracranial hemorrhage lesions present both infratentorially and supratentorially, measuring up to 2.8 cm in R cerebellar hemisphere-no surrounding edema. Multiple smaller ring-enhancing lesions present t/o cerebral hemispheres bilaterally consistent with Hx metastatic disease-no associated edema or midline shift. CT-guided liver biopsy scheduled for 6/2/2021 at 8:30 am.  Palliative care meeting with family today. Poor prognosis. 2.  Leukocytosis - WBC 28.6. Likely related to known malignancy. Afebrile. VSS. LA 1.4. COVID (-). Flu A/B (-). 3.  Macrocytic anemia - H/H 9.3/31.8. MCV 99.4. Likely malignancy driven. Trend. Transfuse for Hgb < 7 or active bleeding. Check Vit B12/folate. Case discussed with nurse and patient/family. Questions and concerns addressed. Plan made in collaboration with Dr. Kirstie Colmenares.     Electronically signed by   NORMA Lopez CNP on 6/1/2021 at 10:52 AM

## 2021-06-01 NOTE — PROGRESS NOTES
Meeting with family, Alaina Ojeda CNP, and this RN at patient's bedside. Janel GARCIA informing family of patient's options. Including radiation to patient's brain in hopes to stop furhter damage and possibly have some of patient's mentation return. Plan to have liver biopsy done to be able to create plan as far as treatment. Alaina Ojeda CNP informed family that patient's cancer is not curable, but may be able to receive palliative treatment. Family tearful, support given. Family understanding that liver biopsy is needed to know options for patient. Unsure whether they would want patient to proceed forward with radiation at this time. Informed family that they should take time to weigh their options. Plan for now will be have biopsy tomorrow and move forward after. This Rn stated it does take time to get results from biopsy. Alaina Ojeda CNP stated she will see patient tomorrow after biopsy is complete. Family denied further questions, stated they needed time to process. Will follow with patient and family tomorrow after biopsy.

## 2021-06-01 NOTE — PROGRESS NOTES
[facility]  Pulmonary medicine progress note:    Patient - Rizwan Washington   MRN -  [id number]   [facility] # - [de-identified]   - 1965      Date of Admission -  2021 12:58 PM  Date of evaluation -  2021  Room - --A   Hospital Day - 4  Consulting - Killian Catalan MD Primary Care Physician - NORMA Velarde - NP   Chief Complaint   Shortness of breath and lung mass  Active Hospital Problem List      Active Hospital Problems    Diagnosis Date Noted    Cancer associated pain [G89.3]     Severe malnutrition ([address] [E43] 2021     Class: Acute    Dyspnea [R06.00]     Hypercalcemia [E83.52]     Metastatic malignant neoplasm (HCC) [C79.9]     Protein-calorie malnutrition ([address] [E46]     Metastasis to brain ([address] [C79.31]     Subacute intracranial hemorrhage ([address] [I62.9]     Shortness of breath [R06.02] 2021     HPI   49 y. o. male with past medical history of HTN and COPD who presented to [facility] with shortness of breath. Patient has history of metastatic cancer of unknown origin and was at the endoscopy center for EGD today; however it was not completed due to shortness of breath. CT chest done showed right middle lobe lobulated mass and liver mets. CT of the head done 21 to evaluate for metastasis. After CT head revealed \"Multiple intra-axial lesions concerning for metastases, some with hemorrhagic component,\" went in to discuss results with patient prior to MRI. He is c/ogenralized abdominal and back pain. Patient is a chronic smoker, he smoked 1 PPD for 30 years, He worked as a .      Subjective/Events Past 24 hours/ROS   Afebrile  On 2 L of oxygen on nasal cannula  Not in distress  Waiting for CT-guided biopsy of the liver by interventional radiology service  Rest of the review of systems reviewed    Past Medical History         Diagnosis Date    Arthritis     Cancer ([address]     COPD (chronic obstructive pulmonary disease) (HCC)     GERD (gastroesophageal reflux disease)     Hyperlipidemia     Hypertension     Thyroid disease       Past Surgical History           Procedure Laterality Date    CHOLECYSTECTOMY      COLONOSCOPY      COLONOSCOPY  4/2/2021    COLONOSCOPY WITH BIOPSY performed by Yosvany Chau MD at [facility]    COLONOSCOPY  4/2/2021    COLONOSCOPY POLYPECTOMY SNARE/COLD BIOPSY performed by Yosvany Chau MD at [address] [facility]    ENDOSCOPY, COLON, DIAGNOSTIC      FRACTURE SURGERY Right     foot    TONSILLECTOMY      UPPER GASTROINTESTINAL ENDOSCOPY N/A 1/22/2021    EGD BIOPSY performed by Yosvany Chau MD at [facility]     Diet    Diet NPO Effective Now Exceptions are: Other (Specify); Specify Other: no exceptions/ aspiration risk  Allergies    Aspirin  Social History     Social History     Socioeconomic History    Marital status:      Spouse name: Not on file    Number of children: Not on file    Years of education: Not on file    Highest education level: Not on file   Occupational History    Not on file   Tobacco Use    Smoking status: Current Every Day Smoker     Packs/day: 1.00     Years: 30.00     Pack years: 30.00    Smokeless tobacco: Never Used   Substance and Sexual Activity    Alcohol use: Yes     Comment: 3-4 times/ week beer    Drug use: Never    Sexual activity: Not on file   Other Topics Concern    Not on file   Social History Narrative    Not on file     Social Determinants of Health     Financial Resource Strain:     Difficulty of Paying Living Expenses:    Food Insecurity:     Worried About Running Out of Food in the Last Year:     Ran Out of Food in the Last Year:    Transportation Needs:     Lack of Transportation (Medical):      Lack of Transportation (Non-Medical):    Physical Activity:     Days of Exercise per Week:     Minutes of Exercise per Session:    Stress:     Feeling of Stress :    Social Connections:  Frequency of Communication with Friends and Family:     Frequency of Social Gatherings with Friends and Family:     Attends Nondenominational Services:     Active Member of Clubs or Organizations:     Attends Club or Organization Meetings:     Marital Status:    Intimate Partner Violence:     Fear of Current or Ex-Partner:     Emotionally Abused:     Physically Abused:     Sexually Abused:      Family History    History reviewed. No pertinent family history. Sleep History    No history   Meds    Current Medications    senna  2 tablet Oral Nightly    [START ON 6/2/2021] naloxegol  12.5 mg Oral QAM    lidocaine  3 patch Transdermal Daily    levETIRAcetam  500 mg Oral BID    dexamethasone  4 mg Oral 3 times per day    docusate sodium  100 mg Oral BID    [Held by provider] morphine  15 mg Oral 2 times per day    sodium chloride flush  5-40 mL Intravenous 2 times per day    levothyroxine  125 mcg Oral Daily    [Held by provider] atorvastatin  10 mg Oral Daily    metoprolol tartrate  25 mg Oral BID    pantoprazole  40 mg Oral QAM AC     morphine, [Held by provider] oxyCODONE **OR** [Held by provider] oxyCODONE, sodium chloride flush, sodium chloride, promethazine **OR** ondansetron, polyethylene glycol, acetaminophen **OR** acetaminophen, albuterol  IV Drips/Infusions   sodium chloride      sodium chloride 150 mL/hr at 05/30/21 1451     Vitals    Vitals    height is 5' 9\" (1.753 m) and weight is 203 lb 1.6 oz (92.1 kg). His oral temperature is 97.6 °F (36.4 °C). His blood pressure is 147/68 (abnormal) and his pulse is 90. His respiration is 18 and oxygen saturation is 91%.      O2 Flow Rate (L/min): 1 L/min  I/O    Intake/Output Summary (Last 24 hours) at 6/1/2021 1912  Last data filed at 6/1/2021 1639  Gross per 24 hour   Intake 2325.04 ml   Output 1025 ml   Net 1300.04 ml     Patient Vitals for the past 96 hrs (Last 3 readings):   Weight   06/01/21 0330 203 lb 1.6 oz (92.1 kg)     Exam Constitutional: Patient appears in no distress. Mouth/Throat: Oropharynx is clear and moist.  No oral thrush. Neck: Neck supple. Cardiovascular: S1 and S2 heard. No murmurs. Pulmonary/Chest: Bilateral air entry present. Good breath sounds on both sides, diminished at bases. No wheezes. No rales. Abdominal: Soft. No tenderness. Hepatomegaly  Extremities: Patient exhibits no edema.    Neurological: Patient is awake but at times confused    Labs   ABG  No results found for: PH, PO2, PCO2, HCO3, O2SAT  No results found for: MALIK Mayfield  CBC  Recent Labs     05/30/21  0518 05/31/21 0449 06/01/21  0438   WBC 24.0* 26.5* 28.6*   RBC 3.37* 3.18* 3.20*   HGB 9.8* 9.4* 9.3*   HCT 32.4* 31.5* 31.8*   MCV 96.1* 99.1* 99.4*   MCH 29.1 29.6 29.1   MCHC 30.2* 29.8* 29.2*    139 136   MPV 10.1 10.8 10.8      BMP  Recent Labs     05/30/21  1459 05/31/21 0449 06/01/21  0438    140 142   K 4.4 4.6 4.4    105 109   CO2 23 20* 21*   BUN 36* 37* 45*   CREATININE 1.3* 1.0 1.2   GLUCOSE 106 116* 129*   CALCIUM 10.6* 10.6* 10.8*     LFT  Recent Labs     05/30/21  1459 05/31/21 0449   * 144*   ALT 52 50   BILITOT 2.1* 2.3*   ALKPHOS 760* 793*     TROP  Lab Results   Component Value Date    TROPONINT < 0.010 05/28/2021     BNP  Lab Results   Component Value Date    PROBNP 2406.0 05/28/2021     PFTs   None   Echo    None  Cultures    Procalcitonin  No results found for: [facility]    Radiology    CT Scans          Assessment   -Metastatic carcinoma, suspect lung origin-is currently waiting for CT-guided biopsy of the liver  -Multiple smaller ring-enhancing lesions present throughout the cerebral hemispheres bilaterally consistent with history of metastatic disease.  -Chronic history of tobacco smoking for 30 years-high suspicion for COPD  -Hypercalcemia secondary to bone mets-improving  -Elevated liver function tests-due to metastatic lesion  -Anemia-stable hemoglobin and

## 2021-06-01 NOTE — PLAN OF CARE
Problem: Falls - Risk of:  Goal: Will remain free from falls  Description: Will remain free from falls  Outcome: Ongoing  Goal: Absence of physical injury  Description: Absence of physical injury  Outcome: Ongoing     Problem: Skin Integrity:  Goal: Will show no infection signs and symptoms  Description: Will show no infection signs and symptoms  Outcome: Ongoing  Goal: Absence of new skin breakdown  Description: Absence of new skin breakdown  Outcome: Ongoing     Problem: Pain:  Goal: Pain level will decrease  Description: Pain level will decrease  Outcome: Ongoing  Goal: Control of acute pain  Description: Control of acute pain  Outcome: Ongoing  Goal: Control of chronic pain  Description: Control of chronic pain  Outcome: Ongoing     Problem: Breathing Pattern - Ineffective:  Goal: Ability to achieve and maintain a regular respiratory rate will improve  Description: Ability to achieve and maintain a regular respiratory rate will improve  Outcome: Ongoing     Problem: Infection:  Goal: Will remain free from infection  Description: Will remain free from infection  Outcome: Ongoing     Problem: Daily Care:  Goal: Daily care needs are met  Description: Daily care needs are met  Outcome: Ongoing     Problem: Discharge Planning:  Goal: Patients continuum of care needs are met  Description: Patients continuum of care needs are met  Outcome: Ongoing     Problem: Nutrition  Goal: Optimal nutrition therapy  Outcome: Ongoing     Problem: Falls - Risk of:  Goal: Will remain free from falls  Description: Will remain free from falls  5/31/2021 2040 by Teri Yepez RN  Outcome: Ongoing  Note: No falls noted this shift. Continue falling star program. Bed alarm on, bed in low position. Call light and personal belongings in reach. Patient uses call light appropriately. 5/31/2021 1441 by Freddy Elkins RN  Outcome: Ongoing  Note: Patient free from falls. Wears non-skid footwear while out of bed.   Call light in reach at all times. Alarm on while in bed and while in chair. Hourly rounding continued. Goal: Absence of physical injury  Description: Absence of physical injury  5/31/2021 2040 by Allyn Dominguez RN  Outcome: Ongoing  Note: No injury noted this shift. Continue falling star program. Bed alarm on, bed in low position. Call light and personal belongings in reach. Patient uses call light appropriately. 5/31/2021 1441 by Maida Stovall RN  Outcome: Ongoing     Problem: Skin Integrity:  Goal: Will show no infection signs and symptoms  Description: Will show no infection signs and symptoms  5/31/2021 2040 by Allyn Dominguez RN  Outcome: Ongoing  Note: No signs of infection noted yet will continue to monitor throughout the shift. 5/31/2021 1441 by Maida Stovall RN  Outcome: Ongoing  Goal: Absence of new skin breakdown  Description: Absence of new skin breakdown  5/31/2021 2040 by Allyn Dominguez RN  Outcome: Ongoing  Note: No skin break down noted at this time. Encouraged patient to reposition self in bed.   5/31/2021 1441 by Maida Stovall RN  Outcome: Ongoing     Problem: Pain:  Goal: Pain level will decrease  Description: Pain level will decrease  5/31/2021 2040 by Allyn Dominguez RN  Outcome: Ongoing  Note: No complaint of pain voiced at this time. Continue to monitor. PRN medications available if needed. 5/31/2021 1441 by Maida Stovall RN  Outcome: Ongoing  Note: Moans and grunts often, unable to rate pain but when asked if he is in pain, states yes. Administered scheduled pain medication and PRN medications per orders. Slept well after pain medication administration. Goal: Control of acute pain  Description: Control of acute pain  Outcome: Ongoing  Note: No complaint of pain voiced at this time. Continue to monitor. PRN medications available if needed. Goal: Control of chronic pain  Description: Control of chronic pain  Outcome: Ongoing  Note: No complaint of pain voiced at this time. Continue to monitor. PRN medications available if needed. Problem: Breathing Pattern - Ineffective:  Goal: Ability to achieve and maintain a regular respiratory rate will improve  Description: Ability to achieve and maintain a regular respiratory rate will improve  5/31/2021 2040 by Breann Gan RN  Outcome: Ongoing  Note: Patient is maintaining a regular respiratory rate. Will continue to monitor. 5/31/2021 1441 by Real Bartlett RN  Outcome: Ongoing  Note: Currently wears O2 at 2L nasal  cannula, often has on his cheeks or off completely. Education of need of nasal cannula continues. Problem: Infection:  Goal: Will remain free from infection  Description: Will remain free from infection  Outcome: Ongoing  Note: No signs of infection noted yet will continue to monitor throughout the shift. Problem: Daily Care:  Goal: Daily care needs are met  Description: Daily care needs are met  Outcome: Ongoing  Note: Patient is being assisted with ADL's by nursing assistant staff. Will follow up. Problem: Discharge Planning:  Goal: Patients continuum of care needs are met  Description: Patients continuum of care needs are met  5/31/2021 2040 by Breann Gan RN  Outcome: Ongoing  Note: Patient's needs are being met and will be evaluated and carried out upon discharge. 5/31/2021 1441 by Real Bartlett RN  Outcome: Ongoing  Note: Discharge planning continues as patient lives with elderly mother, which will not be able to care for patient upon discharge. Problem: Nutrition  Goal: Optimal nutrition therapy  Outcome: Ongoing  Note: Patient is NPO this night a midnight. Patient did eat some dinner.

## 2021-06-01 NOTE — PROGRESS NOTES
Patient was in pain yelling out \"ah I can't take it anymore. \" Patient appears confused and does not know the date or why he is here. Scheduled morphine was given as well as other PRN med. When the patient took scheduled meds the patient appeared to take the pills fine with no issues. When the PRN oxycodone was given the patient appeared to be having difficulty swallowing and breathing. Patient began to cough persistently. This nurse was told by the previous shift that the patient is able to take pills whole. This nurse will put in a speech consult to evaluate further. Provider is being alerted by hospital contact system. 2320 5/31/2021  MIKAEL Tuttle told this nurse to make the patient NPO with no exceptions stating, \"If he cant swallow small pills I wouldnt give him anything. \"    6/1/2021 0430    Patient self removed IV. Patient is not in distress. Patient bleeding has been controlled. IV fluids were moved to another IV site. Patient remains very confused and cannot voice where he is, nor why he is here. Tele-sitter has been placed with the patient for safety. 6/1/2021 0537  Patient awoke and is very confused and restless. When the nurse asked if they are in pain the patient responded yes. After encouraging to describe the patient claims to be 7/10 aching in hips. Patient is strict NPO due to aspiration risk. This nurse consulted Maida YOUSSEF to see what meds she would like to replace his oral meds with.  Maida YOUSSEF responded with \"4mg of morphine IVP x1\"

## 2021-06-01 NOTE — PROGRESS NOTES
This biopsy is scheduled for 0830 on 6/2/21. This RN notified Melissa Raman. PT needs to be 4hrs prior. Sign and held orders needs to be released on 6/2/21 after midnight. Please make sure pt has an IV and the 0.45% is running prior to coming for biopsy.

## 2021-06-01 NOTE — PROGRESS NOTES
On floor to see patient, primary RN Mignon Moss, stated Yves Souza CNP will be by at [phone] to speak with patient and family and palliative requested to be involved with conversation.  Will return at [phone]

## 2021-06-01 NOTE — PROGRESS NOTES
[address] [place] [id number]  SPEECH THERAPY  [facility] RENAL TELEMETRY 6K  Clinical Swallow Evaluation      SLP Individual Minutes  Time In: 8792  Time Out: 9285  Minutes: 10  Timed Code Treatment Minutes: 0 Minutes       Date: 2021  Patient Name: Lexy Miller      CSN: [id number]   : 1965  (49 y.o.)  Gender: male   Referring Physician:  Quang Joseph PA-C  Diagnosis: Shortness of breath   Secondary Diagnosis: Dysphagia  History of Present Illness/Injury: Patient admitted to [facility] with above dx. See physician H&P for full report. ST consulted d/t concerns for aspiration/difficulty swallowing. ST to complete clinical swallow evaluation and determine safety of PO diet and further POC. Past Medical History:   Diagnosis Date    Arthritis     [facility]     COPD (chronic obstructive pulmonary disease) ([facility]     GERD (gastroesophageal reflux disease)     Hyperlipidemia     Hypertension     Thyroid disease        SUBJECTIVE:  Patient seen at bedside; alert and fairly cooperative provided much encouragement. Patient able to follow basic commands and respond to very basic questions, inconsistent responses overall     OBJECTIVE:    Pain: Patient reporting, \"pain in shoulders and back/butt. \" When asked to rate pain patient stated \"bad\"     Current Diet: NPO; no means of nutrition at this time     Respiratory Status:  Nasal Canula - 1L     Behavioral Observation:  Alert, Confused, Lethargic and Limited Direction Following    Oral Mechanism Evaluation:      Facial / Labial WFL    Lingual WFL    Dentition WFL    Velum Not Tested    Vocal Quality Impaired Hoarse, low vocal intensity    Sensation Impaired    Cough Impaired Weak      Patient Evaluated Using:   Thin liquids via cup and straw, puree, hard solids     Oral Phase:  Impaired:  Impaired AP Movement, Impaired Mastication, Reduced Bolus Formation and Impaired Oral Initiation    Pharyngeal Phase: Impaired:  Delayed Swallow, Decreased Hyolaryngeal Elevation and Suspected Pharyngeal Residue    Signs and Symptoms of Laryngeal Penetration/Aspiration: Throat Clear, Immediate Cough and Delayed Cough    Impresssions: Patient presents with suspected moderate-severe oropharyngeal dysphagia as evidence by the findings outlined above. Patient highly fatigued with poor respiratory status. RT present prior to ST entering room; RT reporting \"rhonchi throughout lungs. \" Patient with impaired cognitive status; limited command following with slight resistance to clinical swallow evaluation as bedside. Patient consumed PO trials of thin liquids via cup and straw, puree and hard solids; prolonged oral mastication with reduced bolus formation, delayed swallow, with suspected decreased hyolaryngeal evaluation. Patient with coughing with and without PO intake. Patient remains at high risk for aspiration d/t current medical status with poor respiratory status; ST highly concerned for aspiration with any PO intake at this time. ST recommending NPO + completion of instrumental evaluation. ST explained MBS study to patient; patient highly resistant/refusing stating \"Nobody should have to do that. \" ST again provided supportive rational to support NPO until MBS is able to be completed to further evaluate; patient again refused stating \"I am 49years old. \" ST asked patient, \"We would like to get you better, this test will help me make those clinical decisions necessary patient. Would you like me to help you get better? \" Patient with no response. ST to plan to f/u 06/02 as priority to complete dysphagia tx/ determine if patient is agreeable to completion of MBS. ST updated RN Georgina. RN Georgina reporting, \"biopsy planned next date 06/02@ 0830. \" ST to follow up 06/02    RECOMMENDATIONS/ASSESSMENT:  Instrumental Evaluation: Modified Barium Swallow (MBS)  Diet Recommendations:  NPO until MBS is completed   *patient refusing MBS at this time stating, \"nobody should have to do that test.\"   Strategies:  Recommend NPO   Rehabilitation Potential: fair    EDUCATION:  Learner: Patient  Education:  Reviewed results and recommendations of this evaluation, Reviewed diet and strategies, Reviewed signs, symptoms and risks of aspiration, Reviewed ST goals and Plan of Care, Reviewed recommendations for follow-up, Education Related to Potential Risks and Complications Due to Impairment/Illness/Injury and Education Related to Prevention of Recurrence of Impairment/Illness/Injury  Evaluation of Education: Needs further instruction, No evidence of learning and Family not present    PLAN:  Recommendations pending MBS and Skilled SLP intervention on acute care 3-5 x per week or until goals met and/or pt plateaus in function. Specific interventions for next session may include: dysphagia tx. PATIENT GOAL:    Did not state. Will further assess during treatment. SHORT TERM GOALS:  Short-term Goals  Timeframe for Short-term Goals: 2 weeks  Goal 1: Patient will consume PO trials of ice chips, thin liquids and puree with SLP only in order to determine readiness for instrumental evaluation  Goal 2: Patient will complete insturmental evaluation in order to furthur evaluate pharyngeal function of the swallow and determine safety of PO diet.   Goal 3: Monitor cognitive baseline and complete evaluation as indiciated    LONG TERM GOALS:  No LTGs due to short [address] [place] [address].[place] [id number]

## 2021-06-01 NOTE — CONSULTS
complains of pain in multiple areas his abdomen, back, flank area, and pleural area and at times he gets headaches. He rated his pain at a 8/10 but was unable to describe it. Reports that current pain medications are helping. In the past 24 hours he has used 20 mg of oxycodone, 12 mg of IV morphine along with his scheduled MS Contin. Past Medical History:        Diagnosis Date    Arthritis     [facility] [address]     COPD (chronic obstructive pulmonary disease) ([facility] [address]     GERD (gastroesophageal reflux disease)     Hyperlipidemia     Hypertension     Thyroid disease        Past Surgical History:        Procedure Laterality Date    CHOLECYSTECTOMY      COLONOSCOPY      COLONOSCOPY  4/2/2021    COLONOSCOPY WITH BIOPSY performed by Preeti Jose MD at [facility]    COLONOSCOPY  4/2/2021    COLONOSCOPY POLYPECTOMY SNARE/COLD BIOPSY performed by Preeti Jose MD at [facility] [place]    ENDOSCOPY, COLON, DIAGNOSTIC      FRACTURE SURGERY Right     foot    TONSILLECTOMY      UPPER GASTROINTESTINAL ENDOSCOPY N/A 1/22/2021    EGD BIOPSY performed by Preeti Jose MD at [facility] [place] [facility]       Allergies:     Allergies   Allergen Reactions    Aspirin      Rye syndrome          Current Medications:   Current Facility-Administered Medications: morphine injection 4 mg, 4 mg, Intravenous, Q4H PRN  [Held by provider] oxyCODONE (ROXICODONE) immediate release tablet 5 mg, 5 mg, Oral, Q4H PRN **OR** [Held by provider] oxyCODONE (ROXICODONE) immediate release tablet 10 mg, 10 mg, Oral, Q4H PRN  levETIRAcetam (KEPPRA) tablet 500 mg, 500 mg, Oral, BID  dexamethasone (DECADRON) tablet 4 mg, 4 mg, Oral, 3 times per day  docusate sodium (COLACE) capsule 100 mg, 100 mg, Oral, BID  [Held by provider] morphine (MS CONTIN) extended release tablet 15 mg, 15 mg, Oral, 2 times per day  sodium chloride flush 0.9 % injection 5-40 mL, 5-40 mL, Intravenous, 2 times per day  sodium chloride flush 0.9 % injection 5-40 mL, 5-40 mL, Frequency of Communication with Friends and Family:     Frequency of Social Gatherings with Friends and Family:     Attends Baptism Services:     Active Member of Clubs or Organizations:     Attends Club or Organization Meetings:     Marital Status:    Intimate Partner Violence:     Fear of Current or Ex-Partner:     Emotionally Abused:     Physically Abused:     Sexually Abused:          Family History:   History reviewed. No pertinent family history.     Review of Systems:  CONSTITUTIONAL:  positive for  fatigue and weight loss  EYES:  negative  HEENT:  positive for  voice change  RESPIRATORY:  negative  CARDIOVASCULAR:  negative  GASTROINTESTINAL:  positive for abdominal pain and abdominal distention, constipation- no documented bowel movement since admission   GENITOURINARY:  negative  SKIN:  negative  HEMATOLOGIC/LYMPHATIC:  negative  MUSCULOSKELETAL:  positive for  myalgias, arthralgias, pain, decreased range of motion, muscle weakness and bone pain  NEUROLOGICAL:  positive for memory problems, speech problems, gait problems, weakness and pain  BEHAVIOR/PSYCH:  negative  System review otherwise negative      Physical Exam:  BP (!) 147/68   Pulse 90   Temp 97.6 °F (36.4 °C) (Oral)   Resp 18   Ht 5' 9\" (1.753 m) Comment: per old EMR chart  Wt 203 lb 1.6 oz (92.1 kg)   SpO2 91%   BMI 29.99 kg/m²      awake  Orientation:   person  Mood: within normal limits  Affect: calm and quiet  General appearance: appearing stated age, mild distress    Memory:  Impaired, decraesed thought processing   Attention/Concentration: decraesed  Language:  abnormal - dysarthria, expressive aphasia     Cranial Nerves:  cranial nerves II-XII are grossly intact  ROM:  Normal all 4 extremities   Motor Exam:  Motor exam is 5 out of 5 all extremities with the exception of 4/5 lower extremities   Tenderness throughout back    Tone:  normal  Muscle bulk: within normal limits      Heart: normal rate, regular rhythm, normal S1, S2, no murmurs, rubs, clicks or gallops  Lungs: clear to auscultation without wheezes or rales  Abdomen: soft, tender, non-distended, normal bowel sounds, no masses or organomegaly    Skin: warm and dry, no rash or erythema  Peripheral vascular: Pulses: Normal upper and lower extremity pulses; Edema: no      Diagnostics:  Recent Results (from the past 24 hour(s))   COVID-19, Rapid    Collection Time: 05/31/21 10:50 PM    Specimen: Nasopharyngeal Swab   Result Value Ref Range    SARS-CoV-2, NAAT NOT DETECTED NOT DETECTED   CBC    Collection Time: 06/01/21  4:38 AM   Result Value Ref Range    WBC 28.6 (H) 4.8 - 10.8 thou/mm3    RBC 3.20 (L) 4.70 - 6.10 mill/mm3    Hemoglobin 9.3 (L) 14.0 - 18.0 gm/dl    Hematocrit 31.8 (L) 42.0 - 52.0 %    MCV 99.4 (H) 80.0 - 94.0 fL    MCH 29.1 26.0 - 33.0 pg    MCHC 29.2 (L) 32.2 - 35.5 gm/dl    RDW-CV 16.5 (H) 11.5 - 14.5 %    RDW-SD 58.1 (H) 35.0 - 45.0 fL    Platelets 611 525 - 352 thou/mm3    MPV 10.8 9.4 - 12.4 fL   Basic Metabolic Panel    Collection Time: 06/01/21  4:38 AM   Result Value Ref Range    Sodium 142 135 - 145 meq/L    Potassium 4.4 3.5 - 5.2 meq/L    Chloride 109 98 - 111 meq/L    CO2 21 (L) 23 - 33 meq/L    Glucose 129 (H) 70 - 108 mg/dL    BUN 45 (H) 7 - 22 mg/dL    CREATININE 1.2 0.4 - 1.2 mg/dL    Calcium 10.8 (H) 8.5 - 10.5 mg/dL   Anion Gap    Collection Time: 06/01/21  4:38 AM   Result Value Ref Range    Anion Gap 12.0 8.0 - 16.0 meq/L   Glomerular Filtration Rate, Estimated    Collection Time: 06/01/21  4:38 AM   Result Value Ref Range    Est, Glom Filt Rate 63 (A) ml/min/1.73m2       MRI of brain:   FINDINGS:   Multiple T2/flair hyperintense T1 isointense, mildly diffusion restricting   ring-enhancing lesions present within the cerebral hemispheres   bilaterally.  No significant surrounding edema.  For example,   Left occipital lobe ring-enhancing lesion measuring 7 mm (series 14, image   12).    Left frontal lobe ring-enhancing lesion measuring 5 mm imprint series 14,   image 18). Right frontal lobe ring-enhancing lesion measuring 4 mm (series 14, image   20)   Right parietal lobe ring-enhancing lesion measuring 5 mm (series 14, image   18). Right temporal lobe ring-enhancing lesion measuring 3 mm (series 14, image   8). Additionally there are T1/T2 hyperintense nonenhancing lesions present   within the right cerebellar hemisphere (measuring up to 2.8 cm), left   temporal lobe (measuring up to 2.5 cm), right frontal lobe (measuring up   to 9 mm) and right parietal lobe (measuring up to 7 mm).  There is minimal   gradient blooming within several of these lesions including the right   parietal lobe and right cerebellar hemisphere suggesting these represent   subacute hemorrhage.  No significant surrounding edema. No extra-axial fluid collection. No hydrocephalus.  Basilar cisterns are patent. 4th ventricle is patent.  No brainstem abnormality identified. Intracranial flow voids are patent. The visualized paranasal sinuses and mastoid air-cells are clear.       Impression:       1.  Several well-contained areas of subacute intracranial hemorrhage   lesions present both infratentorially and supratentorially, measuring up   to 2.8 cm in the right cerebellar hemisphere.  No surrounding edema. 2.  Multiple smaller ring-enhancing lesions present throughout the   cerebral hemispheres bilaterally consistent with history of metastatic   disease.  No associated edema or midline shift. CT of head:   FINDINGS:   Ventricles, cisterns and sulci are symmetric and normal in size and configuration. There is a circumscribed hypodense 2.8 x 2.5 cm intra-axial mass in the right cerebellar hemisphere with irregular peripheral hyperdensity. There is a 3 mm hyperdense   focus in the right frontal lobe. There is a 3 mm hyperdense focus in the left centrum semiovale. There is a 1.8 cm hypodense lesion in the left temporal pole.  Gray-white matter projection otherwise appears grossly preserved. The calvarium appears intact.    Orbits are unremarkable. Visualized paranasal sinuses are clear. Mastoid air cells are clear.       Impression:        Multiple intra-axial lesions concerning for metastases, some with hemorrhagic component. Recommend MRI of the brain with contrast for further evaluation. CT of abdomen and pelvis:     FINDINGS: Previous CT scan noncontrast liver is not exactly comparable but there are extensive hepatic metastases probably not significantly changed. There is no ductal dilatation seen. Prior cholecystectomy. Spleen is normal.   Ill-defined mass right kidney measures 2.2 cm. This is seen on prior MRI. Nodular fullness of the left adrenal gland. This is stable compared to the prior exam. Nonenlarged lymph node posterior to the cava. 8 mm soft tissue nodule posterior to the cava. No other retroperitoneal or central mesenteric lymphadenopathy seen. 3.5   cm infrarenal abdominal aortic aneurysm. Pelvis   Uncomplicated diverticulosis. Mild prostamegaly. No bowel obstruction. Normal appendix. Lytic lesion lateral body L1   Lung bases   Please see the same-day dedicated exam.       Impression:       1. Extensive hepatic metastases   2. 2 separate soft tissue nodules posterior to cava possibly lymphadenopathy or metastases   3. Ill-defined lesion right kidney seen on MRI as well. Suspicious for malignancy. 3.5 cm infrarenal abdominal aortic aneurysm. Osseous metastases. CTA of chest:   FINDINGS: Small soft tissue density along the right lateral wall of the trachea. No pulmonary embolism. No aneurysm or dissection. Spiculated mass right upper lung adjacent to the hilum measuring 3.4 cm. Right hilar adenopathy. Soft tissue node measuring 1.9 cm. A millimeter nodule medial right lower lobe image 120 series 4. Right paraspinal mass 2.9 cm image 140.  Possible second paraspinal mass on the left image 186 measuring 1.1 x 2.1 cm Upper abdomen   Please see the same-day dedicated exam.   Bones   Negative lesions are seen in the C7 vertebral body T2 vertebral body probably T9 and T8 vertebral bodies subtle lytic lesion lateral aspect left third rib large lytic lesion lateral aspect right sixth rib       Impression:       1. No PE   2. Right hilar adenopathy and lung metastases as detailed above. 3. Osseous lytic metastases detailed above. Impression:  1. Cancer associated pain   2. 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 Opioid induced constipation     Met with today's pain team as above. Discussed patient symptoms, reviewed medications and possible drug interactions, medication side effect profiles, previous medications and their efficacies, medical concerns regarding each pain management option (ie blood pressure, GI concerns, etc). Also reviewed labs (including creatinine clearance and LFT's regarding medication metabolism). Also reviewed all work-up studies including radiologic and other consultants. OARRS report was obtained and reviewed. Recommendations:  1. Started Lidoderm patches- 3 patches daily to painful areas   2. Continue current medications for pain: tylenol 650 mg every 6 hours for mild pain of 1-3/10 of fever, Oxy IR 5 mg to 10 mg every 4 hours for moderate to severe breakthrough pain of 4-10/10, and IV morphine 4 mg every 4 hours as needed for severe pain of 7-10/10 unrelieved after oral pain medications given first or the inability to swallow  3. Continue Decadron   4. Bowel program- senna, colace Miralax   5. Agree with palliative care consult  6. Will continue to follow and titrate medications as needed for comfort. Will be gail medina willing to follow this patient after discharge in the outpatient pain clinic for his ongoing pain management needs. I spent over 50 minutes evaluating this patient and completing documentation.  It was my pleasure to evaluate Kinsey Collins today. Please call with questions.     Steff Olson, NORMA - CNP

## 2021-06-01 NOTE — PROGRESS NOTES
Received call from radiation center for this patient requesting info in regards to liver biopsy so they could do radiation, informed them that this would be done 6/2/2021. Stated they were calling [facility] to get patient to radiation center for treatment this day.

## 2021-06-02 PROBLEM — C79.9 METASTATIC MALIGNANT NEOPLASM OF UNKNOWN PRIMARY SITE (HCC): Status: ACTIVE | Noted: 2021-01-01

## 2021-06-02 PROBLEM — C80.1 METASTATIC MALIGNANT NEOPLASM OF UNKNOWN PRIMARY SITE (HCC): Status: ACTIVE | Noted: 2021-01-01

## 2021-06-02 NOTE — PROGRESS NOTES
Hospice referral completed in patient room with twin brother Nico Nephew his wife, patient girlfriend, and patient father with MIL. During referral patient noted to be restless/agitated in bed with moaning out with furrow eyebrow. Patient responds only to painful stimuli. Noted to have work of breathing with use of accessory muscles. respirations 26 min. This continued during most of verbal presentation with short moments of ablity to rest peaceful. Patient has had 24mg IV push morphine and lorazepam 1.5mg IV push in last 24hrs. Talked with primary nurse Georgina RN and reports that patient agiation/restlessness seems to be related to pain. Symptoms appearing to be better controlled with morphine IV push than ativan dose. Discussed hospice care with criteria needed for GIP hospice care as symptoms unable to be done in other setting and review to be done with medical director for appropriate level of care. Family voiced understanding and wish to stop all aggressive treatments with focus on comfort. Did discuss with them that with patient symptoms and medication that has been given that probable need of continous infusion of comfort medication with titration orders for increases based off of patient needs. All voiced understanding and wish for evaulation of patient for possible GIP hospice. Talked with Dr. Laura Kee, patient attending provider, feels patient appropriate for morphine PCA 1mg/hr for symptom management. Order received to start. Discussed with Dr. Naun Osborn and permission for admission to [facility] hospice care given for symptoms unable to be managed in other setting in terminally ill patient. Case reviewed by Dr. Mela Ramires, medical director, physician agrees that patient GIP appropriate with symtpoms unable to be managed in other setting. Discussed with Dr. Laura Kee and discharge/readmit completed with Dr. Laura Kee to complete short discharge summary for acute stay and H&P for hospice admit.

## 2021-06-02 NOTE — PROGRESS NOTES
[address] [address]  SPEECH THERAPY MISSED TREATMENT NOTE  [facility] RENAL TELEMETRY 6K      Date: 2021  Patient Name: Nicanor Valentin        MRN: [id number]    : 1965  (49 y.o.)    REASON FOR MISSED TREATMENT:  Phone call completed to RN Georgina to determine appropriateness to engage in dysphagia interventions as well as potential formal instrumentation via MBS. Per nursing, overall decline in status with increased restlessness and agitation; request to hold services at this time. Further indications conveyed for potential transition to comfort care as well as inpatient hospice. Will f/u on 6/3, ascertaining readiness to proceed vs discontinue established POC.       Josie Whitfield M.A., [address] [place] [address]

## 2021-06-02 NOTE — PROGRESS NOTES
Hospitalist Progress Note      Patient:  Paul Collins    Unit/Bed:6K-08/008-A  YOB: 1965  MRN: [id number]   Acct: [de-identified]   PCP: NORMA Steele - NP  Date of Admission: 5/28/2021    Assessment/Plan:    AMS: likely Delirium  Likely multifactorial 2/2 metabolic encephalopathy and mets to brain. Metastatic cancer of unknown primary source  - suspecting lung vs renal as primary  - CT chest: Right hilar adenopathy and lung metastases   - CT Abd/Pelv: extensive hepatic metastasis and right kidney mass  - Diffuse bony metastases noted on imaging  - MRI brain several well-contained areas of subacute intracranial hemorrhage lesions present both infratentorially and supratentorially, measuring up to 2.8 cm in the right cerebellar hemisphere. Multiple smaller ring-enhancing lesions present throughout the cerebral hemispheres bilaterally consistent with history of metastatic disease.  No associated edema or midline shift. Noted pt scheduled for liver biopsy; would have considered EBUS for tissue Dx over liver or brain lesions. However, per personal d/w family given extremely poor prognosis regardless of Dx, decision was made to withdraw active care w/ no diagnostic/therapeutic interventions w/ focus on comfort measures w/ hospice care (now consulted)     Multiple brain lesions with subacute Intracranial hemorrhage  - MRI brain several well-contained areas of subacute intracranial hemorrhage lesions present both infratentorially and supratentorially, measuring up to 2.8 cm in the right cerebellar hemisphere. Multiple smaller ring-enhancing lesions present throughout the cerebral hemispheres bilaterally consistent with history of metastatic disease.  No associated edema or midline shift.   - on keppra for seizure prophylaxis and steroids  Ordered CT head w/o to follow up for possible increased ICP/midline shift; however, plan now hospice.  pt will likely qualify for IP hospice care     Intractable pain secondary to #1  - planned IV morphine now given plan above     Acute hypoxic resp failure; DDX; PE, aspiration pneumonitis/PNA, vs others; comfort care only at this time     Dysphagia  NPO for now while awaiting SLP eval     Hypercalcemia (PTH independent)  - likely related to malignancy  - improved  - Continue IV hydration     Elevated LFTs w/ hyperALP and Hyperbilirubinemia c/w cholestatic etiology  - possibly secondary to metastatic liver lesions  - CT abd/plv shows Extensive hepatic metastases, 2 separate soft tissue nodules posterior to cava possibly lymphadenopathy or metastases, Ill-defined lesion right kidney seen on MRI as well, 3.5 cm infrarenal abdominal, aortic aneurysm. Osseous metastases. - Avoid hepatotoxic agents.       Leukocytosis  - reactive/ denationalization 2/2 CS vs others (aspiration PNA?). No other identifiable localized source of infection. Will start on zosyn for now     Acute Normo/macrocytic Anemia  -Etiology unclear; no clinically evident bleed. HD stable. No further W/U given planned hospice care    Abdominal Aortic Aneurysm  -CT abd/pelvis noted 3.5 cm infrarenal AAA  -Serial monitoring as outpatient     Essential Hypertension  Fairly controlled on home meds. Monitor     Hx of Hyperlipidemia  -Lipid panel 4/30/21: cholesterol 254/ HDL 43 /  / Triglycerides 146  -on statin which is held now due to transaminitis     GERD  -Continue PPI     Hypothyroidism  -Continue Synthroid; TSH from 4/30 WNLs.    Severe Malnutrition  -13 lb weight loss due to known malignancy  -Consult dietician for recommendations       Chief Complaint: SOB    Initial H and P:-    Admitted for SOB during planned EGD. Multiple services already following. I took over care on 6/2/2021.        Subjective (past 24 hours):   Pt non-verbalzing      Past medical history, family history, social history and allergies reviewed again and is unchanged since admission. ROS (12 point review of systems completed. Pertinent positives noted. Otherwise ROS is negative)     Medications:  Reviewed    Infusion Medications    sodium chloride      sodium chloride 150 mL/hr at 06/02/21 9209     Scheduled Medications    senna  2 tablet Oral Nightly    naloxegol  12.5 mg Oral QAM    lidocaine  3 patch Transdermal Daily    levETIRAcetam  500 mg Oral BID    dexamethasone  4 mg Oral 3 times per day    docusate sodium  100 mg Oral BID    [Held by provider] morphine  15 mg Oral 2 times per day    sodium chloride flush  5-40 mL Intravenous 2 times per day    levothyroxine  125 mcg Oral Daily    [Held by provider] atorvastatin  10 mg Oral Daily    metoprolol tartrate  25 mg Oral BID    pantoprazole  40 mg Oral QAM AC     PRN Meds: morphine, LORazepam, [Held by provider] oxyCODONE **OR** [Held by provider] oxyCODONE, sodium chloride flush, sodium chloride, promethazine **OR** ondansetron, polyethylene glycol, acetaminophen **OR** acetaminophen, albuterol      Intake/Output Summary (Last 24 hours) at 6/2/2021 0830  Last data filed at 6/2/2021 0317  Gross per 24 hour   Intake 2497.11 ml   Output 675 ml   Net 1822.11 ml       Diet:  Diet NPO Effective Now Exceptions are: Other (Specify); Specify Other: no exceptions/ aspiration risk    Exam:  BP (!) 146/66   Pulse 110   Temp 97.4 °F (36.3 °C) (Axillary)   Resp 24   Ht 5' 9\" (1.753 m) Comment: per old EMR chart  Wt 201 lb 11.2 oz (91.5 kg)   SpO2 90%   BMI 29.79 kg/m²   General appearance: uncomfortable; moaning; confused and non-verbalizing  HEENT: Pupils mid dilated equal sluggishly reactive to light. Conjunctivae/corneas clear. Neck: Supple, with full range of motion. No jugular venous distention. Trachea midline. Respiratory:  Normal respiratory effort. Clear to auscultation, bilaterally without Rales/Wheezes/Rhonchi.   Cardiovascular: Regular rate and rhythm with normal S1/S2 without murmurs, rubs or gallops. Abdomen: Soft, non-tender, non-distended with normal bowel sounds. Musculoskeletal: passive and active ROM x 4 extremities. Skin: Skin color, texture, turgor normal.  No rashes or lesions. Neurologic:  Neurovascularly intact without any focal sensory/motor deficits. Cranial nerves: II-XII intact, grossly non-focal.  Psychiatric: Alert and oriented, thought content appropriate, normal insight  Capillary Refill: Brisk,< 3 seconds   Peripheral Pulses: +2 palpable, equal bilaterally     Labs:   Recent Labs     05/31/21 0449 06/01/21 0438 06/02/21  0535   WBC 26.5* 28.6* 23.7*   HGB 9.4* 9.3* 8.9*   HCT 31.5* 31.8* 30.6*    136 136     Recent Labs     05/31/21 0449 06/01/21  0438 06/02/21  0535    142 146*   K 4.6 4.4 3.9    109 112*   CO2 20* 21* 22*   BUN 37* 45* 41*   CREATININE 1.0 1.2 1.2   CALCIUM 10.6* 10.8* 10.5     Recent Labs     05/30/21  1459 05/31/21  0449 06/02/21  0535   * 144* 135*   ALT 52 50 65   BILIDIR  --  1.8* 2.3*   BILITOT 2.1* 2.3* 2.7*   ALKPHOS 760* 793* 771*     No results for input(s): INR in the last 72 hours. No results for input(s): Tennis Saunemin in the last 72 hours. Microbiology:    Blood culture #1: No results found for: BC    Blood culture #2:No results found for: Theresa Ricks    Organism:No results found for: ORG    No results found for: LABGRAM    MRSA culture only:No results found for: [address] [place] [address]    Urine culture: No results found for: LABURIN    Respiratory culture: No results found for: CULTRESP    Aerobic and Anaerobic :  No results found for: LABAERO  No results found for: LABANAE    Urinalysis:    No results found for: Leonce Nigh, BACTERIA, RBCUA, BLOODU, SPECGRAV, GLUCOSEU    Radiology:  MRI BRAIN W WO CONTRAST   Final Result   1. Several well-contained areas of subacute intracranial hemorrhage    lesions present both infratentorially and supratentorially, measuring up    to 2.8 cm in the right cerebellar hemisphere.   No surrounding edema. 2.  Multiple smaller ring-enhancing lesions present throughout the    cerebral hemispheres bilaterally consistent with history of metastatic    disease. No associated edema or midline shift. This document has been electronically signed by: Rut Basilio MD on    05/29/2021 06:29 PM         CT HEAD WO CONTRAST   Final Result    Multiple intra-axial lesions concerning for metastases, some with hemorrhagic component. Recommend MRI of the brain with contrast for further evaluation. **This report has been created using voice recognition software. It may contain minor errors which are inherent in voice recognition technology. **      Final report electronically signed by Dr. Evangelista Pereira MD on 5/29/2021 2:32 PM      CTA CHEST W [address]   Final Result   1. No PE   2. Right hilar adenopathy and lung metastases as detailed above. 3. Osseous lytic metastases detailed above. **This report has been created using voice recognition software. It may contain minor errors which are inherent in voice recognition technology. **      Final report electronically signed by Dr. Bran Reilly on 5/28/2021 3:20 PM      CT ABDOMEN PELVIS W IV CONTRAST Additional Contrast? Radiologist Recommendation   Final Result   1. Extensive hepatic metastases   2. 2 separate soft tissue nodules posterior to cava possibly lymphadenopathy or metastases   3. Ill-defined lesion right kidney seen on MRI as well. Suspicious for malignancy. 3.5 cm infrarenal abdominal aortic aneurysm. Osseous metastases. **This report has been created using voice recognition software. It may contain minor errors which are inherent in voice recognition technology. **      Final report electronically signed by Dr. Bran Reilly on 5/28/2021 3:30 PM      CT Needle Biopsy Liver Percutaneous    (Results Pending)     No results found.   With RN in room, patient and town brother were updated about and agreed upon the treatment plan, all the questions and concerns were addressed.     Electronically signed by Aravind Mcdermott MD on 6/2/2021 at 8:30 AM

## 2021-06-02 NOTE — PROGRESS NOTES
Received call from primary RN, Michael Carlos, stating patient is becoming more confused. IR unable to complete scheduled biopsy due to patient safety concerns. Patient unable to follow commands at this time. This RN on floor to see patient and speak with Michael Carlos RN. Patient in bed, moaning. Attempted to call brother, Sharon Garcia, no answer, voicemail left to return call.

## 2021-06-02 NOTE — PROGRESS NOTES
[facility]    Patient - Eliezer Andrew   MRN -  [id number]   [facility] # - [de-identified]   - 1965      Date of Admission -  2021 12:58 PM  Date of evaluation -  2021  Room - --ROBBY Yip MD Primary Care Physician - NORMA Montiel - NP       Reason for Consult    Metastatic cancer of unknown origin  Brain mets with hemorrhagic component  Known pt to our office  Follows with Dr. 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 male admitted for SOB. Pt was at endoscopy center for EGD, procedure cancelled d/t SOB at rest & sent to ED for further evaluation. Progressive SOB reported by pt, worsening x 2 weeks. Dry cough, heart palpitations, generalized abd pain, bilateral hip/back pain also reported. Pt had recent COVID-19 vaccination 2021. WBC 25.5. LFTs elevated. CTA chest (-) PE, (+) R hilar adenopathy & lung metastases, osseous lytic metastases. CTA Abd/Pelvis (+) extensive hepatic metastases, ill-defined lesion R kidney, suspicious for malignancy; 3.5 cm infrarenal abd aortic aneurysm; osseous mets. CT head (+) multiple intra-axial lesions concerning for metastases, some with hemorrhagic componenet. MRI brain (+) several well-contained areas of subacute intracranial hemorrhage lesions present both infratentorially and supratentorially, measuring up to 2.8 cm in R cerebellar hemisphere-no surrounding edema. Multiple smaller ring-enhancing lesions present t/o cerebral hemispheres bilaterally consistent with Hx metastatic disease-no associated edema or midline shift. CT-guided liver biopsy scheduled for 6/2/2021 at 8:30 am.  Radiation Oncology on case for palliative radiation options. Chronic smoker, hx 1 ppd x 30 years. 6/1/2021:  Pt resting in bed, family not present. Pt reports pain \"all over\" and nausea. Pt currently NPO d/t aspirating. Pt not A & O, can tell me his name only. Pt having a hard time articulating, speech garbled at times. Visibly SOB, on 1 lpm at 92%. Pt has productive cough. Pt answers \"yes\" to many questions, some do not make sense. Palliative care on board, to meet with family later today. 6/2/2021:  Pt resting in bed, family at bedside. Pt resting, loud snoring audible. Pt transitioning to hospice care & PCA being set up. Pt not arousable. Discussed plan of care and prognosis with family at bedside. Family accepting of poor prognosis and are comfortable with their decision to transition to hospice.     Oncology History   Metastatic cancer from unknown origin, liver biopsy scheduled 6/2/2021   Meds    Current Medications    folic acid  1 mg Oral Daily    senna  2 tablet Oral Nightly    naloxegol  12.5 mg Oral QAM    lidocaine  3 patch Transdermal Daily    levETIRAcetam  500 mg Oral BID    dexamethasone  4 mg Oral 3 times per day    docusate sodium  100 mg Oral BID    [Held by provider] morphine  15 mg Oral 2 times per day    sodium chloride flush  5-40 mL Intravenous 2 times per day    levothyroxine  125 mcg Oral Daily    [Held by provider] atorvastatin  10 mg Oral Daily    metoprolol tartrate  25 mg Oral BID    pantoprazole  40 mg Oral QAM AC     LORazepam, morphine, [Held by provider] oxyCODONE **OR** [Held by provider] oxyCODONE, sodium chloride flush, sodium chloride, promethazine **OR** ondansetron, polyethylene glycol, acetaminophen **OR** acetaminophen, Transportation (Medical):  Lack of Transportation (Non-Medical):    Physical Activity:     Days of Exercise per Week:     Minutes of Exercise per Session:    Stress:     Feeling of Stress :    Social Connections:     Frequency of Communication with Friends and Family:     Frequency of Social Gatherings with Friends and Family:     Attends Zoroastrianism Services:     Active Member of Clubs or Organizations:     Attends Club or Organization Meetings:     Marital Status:    Intimate Partner Violence:     Fear of Current or Ex-Partner:     Emotionally Abused:     Physically Abused:     Sexually Abused:      Family History    History reviewed. No pertinent family history. ROS     Review of Systems   Pertinent review of systems noted in HPI, all other ROS negative. Vitals     height is 5' 9\" (1.753 m) and weight is 201 lb 11.2 oz (91.5 kg). His axillary temperature is 97.4 °F (36.3 °C). His blood pressure is 141/65 (abnormal) and his pulse is 107. His respiration is 24 and oxygen saturation is 92%. O2 Flow Rate (L/min): 6 L/min    Exam   Physical Exam   General appearance: MIld distress, agitated and cooperative. Ill-appearing. HEENT: Pupils equal, round, and reactive to light. Conjunctivae/corneas clear. Oral mucosa moist.  Neck: Supple, with full range of motion. Trachea midline. Respiratory:  Increased respiratory effort. LS with crackles t/o, diminished bases. Cardiovascular:  RRR, S1/S2. Abdomen: Soft, tender, slightly distended with hyperactive BS x4. Musculoskeletal: No clubbing, cyanosis. Pt resting in bed, weak, deconditioned. Skin: Skin color, texture, turgor normal.  Serosanguinous drainage noted on chucks pad. Neurologic:  Neurovascularly intact without any focal sensory/motor deficits.  Cranial nerves: II-XII intact, grossly non-focal.  Psychiatric: Alert and oriented x 1, thought content appropriate, normal insight  Capillary Refill: Brisk,< 3 seconds   Peripheral Pulses: +2 palpable, equal bilaterally        Labs   CBC  Recent Labs     05/31/21  0449 06/01/21  0438 06/02/21  0535   WBC 26.5* 28.6* 23.7*   RBC 3.18* 3.20* 3.05*   HGB 9.4* 9.3* 8.9*   HCT 31.5* 31.8* 30.6*   MCV 99.1* 99.4* 100.3*   MCH 29.6 29.1 29.2   MCHC 29.8* 29.2* 29.1*    136 136   MPV 10.8 10.8 10.9      BMP  Recent Labs     05/31/21  0449 06/01/21  0438 06/02/21  0535    142 146*   K 4.6 4.4 3.9    109 112*   CO2 20* 21* 22*   BUN 37* 45* 41*   CREATININE 1.0 1.2 1.2   GLUCOSE 116* 129* 134*   CALCIUM 10.6* 10.8* 10.5     LFT  Recent Labs     05/30/21  1459 05/31/21  0449 06/02/21  0535   * 144* 135*   ALT 52 50 65   BILITOT 2.1* 2.3* 2.7*   ALKPHOS 760* 793* 771*     INR  Recent Labs     06/02/21  0818   INR 1.21*     PTT  Recent Labs     06/02/21  0818   APTT 23.7       Radiology        ECHO Complete 2D W Doppler W Color    Result Date: 5/30/2021  Transthoracic Echocardiography Report (TTE)  Demographics   Patient Name   Jane Gallegos    Gender              Male                 Flavia Smith   MR #           [id number]         Race                                                    Ethnicity   Account #      [de-identified]         Room Number         0008   Accession      [id number]        Date of Study       05/29/2021  Number   Date of Birth  1965        Referring Physician Brandie Lincoln MD   Age            49 year(s)        Sonographer         Winter Huang MD                                   Physician  Procedure Type of Study   TTE procedure:ECHOCARDIOGRAM COMPLETE 2D W DOPPLER W COLOR. Procedure Date Date: 05/29/2021 Start: 10:16 AM Study Location: Bedside Technical Quality: Adequate visualization Indications:Elevated BNP and Dyspnea / Shortness of breath.  Additional Medical History:Smoker, hypertension, hyperlipidemia, GERD, hypothyroidism Patient Status: Routine Height: 69 inches Weight: 184 pounds BSA: 1.99 m^2 BMI: 27.17 kg/m^2 BP: 120/57 mmHg  Conclusions   Summary  Ejection fraction was estimated at 55-60%. There was mild tricuspid regurgitation. Assuming RAP = 5 mmHg, the  estimated RVSP = 41 mmHg. IVC size is within normal limits with normal respiratory phasic changes. Signature   ----------------------------------------------------------------  Electronically signed by Dillan Martinez MD (Interpreting  physician) on 05/30/2021 at 08:34 AM  ----------------------------------------------------------------   Findings   Mitral Valve  The mitral valve structure was normal with normal leaflet separation. DOPPLER: The transmitral velocity was within the normal range with no  evidence for mitral stenosis. There was no evidence of mitral  regurgitation. Aortic Valve  The aortic valve was trileaflet with normal thickness and cuspal  separation. DOPPLER: Transaortic velocity was within the normal range with  no evidence of aortic stenosis. There was no evidence of aortic  regurgitation. Tricuspid Valve  The tricuspid valve structure was normal with normal leaflet separation. DOPPLER: There was no evidence of tricuspid stenosis. There was mild  tricuspid regurgitation. Assuming RAP = 5 mmHg, the estimated RVSP = 41  mmHg. Pulmonic Valve  The pulmonic valve leaflets were not well seen. DOPPLER: The transpulmonic  velocity was within the normal range with no evidence for regurgitation. Left Atrium  Left atrial size was normal.   Left Ventricle  Normal left ventricular size and systolic function. There were no regional wall motion abnormalities. Wall thickness was within normal limits. Ejection fraction was estimated at 55-60%. Right Atrium  Right atrial size was normal.   Right Ventricle  The right ventricular size was normal with normal systolic function and  wall thickness. Pericardial Effusion  The pericardium was normal in appearance with no evidence of a pericardial  effusion.    Pleural Effusion  No evidence of pleural effusion. Aorta / Great Vessels  IVC size is within normal limits with normal respiratory phasic changes.   M-Mode/2D Measurements & Calculations   LV Diastolic   LV Systolic Dimension:    AV Cusp Separation: 1.7 cmLA  Dimension: 4.6 3.2 cm                    Dimension: 3.6 cmAO Root  cm             LV Volume Diastolic: 78.0 Dimension: 3.4 cmLA Area: 15.2  LV FS:30.4 %   ml                        cm^2  LV PW          LV Volume Systolic: 41 ml  Diastolic: 0.8 LV EDV/LV EDV Index: 97.3  cm             ml/49 m^2LV ESV/LV ESV  Septum         Index: 41 ml/21 m^2       RV Diastolic Dimension: 2.5 cm  Diastolic: 1   EF Calculated: 57.9 %  cm                                       LA/Aorta: 1.06                                            LA volume/Index: 40.9 ml /21m^2  Doppler Measurements & Calculations   MV Peak E-Wave: 126 cm/s   AV Peak Velocity: 176  LVOT Peak Velocity: 136  MV Peak A-Wave: 89.7 cm/s  cm/s                   cm/s  MV E/A Ratio: 1.4          AV Peak Gradient:      LVOT Peak Gradient: 7  MV Peak Gradient: 6.35     12.39 mmHg             mmHg  mmHg                                                    TV Peak E-Wave: 71.1  MV Deceleration Time: 236                         cm/s  msec                                              TV Peak A-Wave: 45 cm/s  MV P1/2t: 69 msec          IVRT: 77 msec  MVA by PHT:3.19 cm^2                              TV Peak Gradient: 2.02                                                    mmHg  MV E' Septal Velocity: 8.7 AV DVI (Vmax):0.77     TR Velocity:246 cm/s  cm/s                                              TR Gradient:24.21 mmHg  MV A' Septal Velocity: 12                         PV Peak Velocity: 75.8  cm/s                                              cm/s  MV E' Lateral Velocity:                           PV Peak Gradient: 2.3  7.8 cm/s                                          mmHg  MV A' Lateral Velocity:  13.5 cm/s  E/E' septal: 14.48 E/E' lateral: 16.15  [email or web address] [id number]    CT HEAD WO CONTRAST    Result Date: 5/29/2021  PROCEDURE: CT HEAD WO CONTRAST CLINICAL INFORMATION: metastatic cancer, some confusion. COMPARISON: No prior study. TECHNIQUE: Noncontrast 5 mm axial images were obtained through the brain. Sagittal and coronal reconstructions were created. All CT scans at this facility use dose modulation, iterative reconstruction, and/or weight-based dosing when appropriate to reduce radiation dose to as low as reasonably achievable. FINDINGS: Ventricles, cisterns and sulci are symmetric and normal in size and configuration. There is a circumscribed hypodense 2.8 x 2.5 cm intra-axial mass in the right cerebellar hemisphere with irregular peripheral hyperdensity. There is a 3 mm hyperdense focus in the right frontal lobe. There is a 3 mm hyperdense focus in the left centrum semiovale. There is a 1.8 cm hypodense lesion in the left temporal pole. Gray-white matter projection otherwise appears grossly preserved. The calvarium appears intact. Orbits are unremarkable. Visualized paranasal sinuses are clear. Mastoid air cells are clear. Multiple intra-axial lesions concerning for metastases, some with hemorrhagic component. Recommend MRI of the brain with contrast for further evaluation. **This report has been created using voice recognition software. It may contain minor errors which are inherent in voice recognition technology. ** Final report electronically signed by Dr. Obi Munoz MD on 5/29/2021 2:32 PM    CTA CHEST W WO CONTRAST    Result Date: 5/28/2021  PROCEDURE: CTA CHEST W WO CONTRAST CLINICAL INFORMATION: SOB rule out PE . COMPARISON: No prior study. TECHNIQUE: 2-D multiplanar postcontrast images of the chest with 3-D MIPS.  Isovue-370 IV contrast. All CT scans at this facility use dose modulation, iterative reconstruction, and/or weight-based dosing when appropriate to reduce radiation dose to as low as reasonably achievable. FINDINGS: Small soft tissue density along the right lateral wall of the trachea. No pulmonary embolism. No aneurysm or dissection. Spiculated mass right upper lung adjacent to the hilum measuring 3.4 cm. Right hilar adenopathy. Soft tissue node measuring 1.9 cm. A millimeter nodule medial right lower lobe image 120 series 4. Right paraspinal mass 2.9 cm image 140. Possible second paraspinal mass on the left image 186 measuring 1.1 x 2.1 cm Upper abdomen Please see the same-day dedicated exam. Bones Negative lesions are seen in the C7 vertebral body T2 vertebral body probably T9 and T8 vertebral bodies subtle lytic lesion lateral aspect left third rib large lytic lesion lateral aspect right sixth rib     1. No PE 2. Right hilar adenopathy and lung metastases as detailed above. 3. Osseous lytic metastases detailed above. **This report has been created using voice recognition software. It may contain minor errors which are inherent in voice recognition technology. ** Final report electronically signed by Dr. Ashutosh Heck on 5/28/2021 3:20 PM    CT ABDOMEN PELVIS W IV CONTRAST Additional Contrast? Radiologist Recommendation    Result Date: 5/28/2021  PROCEDURE: CT ABDOMEN PELVIS W IV CONTRAST CLINICAL INFORMATION: Abdominal pain Elevated Bilirubin 2.2 (new) P . COMPARISON: 4/25/2021 [facility] CT TECHNIQUE: 2-D multiplanar postcontrast images of the abdomen and pelvis All CT scans at this facility use dose modulation, iterative reconstruction, and/or weight-based dosing when appropriate to reduce radiation dose to as low as reasonably achievable. FINDINGS: Previous CT scan noncontrast liver is not exactly comparable but there are extensive hepatic metastases probably not significantly changed. There is no ductal dilatation seen. Prior cholecystectomy.  Spleen is normal. Ill-defined mass right kidney measures 2.2 cm. This is seen on prior MRI. Nodular fullness of the left adrenal gland. This is stable compared to the prior exam. Nonenlarged lymph node posterior to the cava. 8 mm soft tissue nodule posterior to the cava. No other retroperitoneal or central mesenteric lymphadenopathy seen. 3.5 cm infrarenal abdominal aortic aneurysm. Pelvis Uncomplicated diverticulosis. Mild prostamegaly. No bowel obstruction. Normal appendix. Lytic lesion lateral body L1 Lung bases Please see the same-day dedicated exam.     1. Extensive hepatic metastases 2. 2 separate soft tissue nodules posterior to cava possibly lymphadenopathy or metastases 3. Ill-defined lesion right kidney seen on MRI as well. Suspicious for malignancy. 3.5 cm infrarenal abdominal aortic aneurysm. Osseous metastases. **This report has been created using voice recognition software. It may contain minor errors which are inherent in voice recognition technology. ** Final report electronically signed by Dr. Annemarie Diaz on 5/28/2021 3:30 PM    MRI ABDOMEN W WO CONTRAST MRCP    Result Date: 5/14/2021  EXAMINATION: MRI SCAN OF THE ABDOMEN W WO CONTRAST MRCP. HISTORY: 47-year-old patient with known liver cancer. Abdominal pain. Back pain. TECHNIQUE: MRI scan was carried out through the abdomen before and after intravenous administration of 20 mL of ProHance. MRCP was performed. COMPARISON: CT scan of the abdomen dated 25 April 2021 FINDINGS: Lung bases: Right paraspinal mass measuring 2.4 x 2.4 cm in size, unchanged since previous CT scan dated 25 April 2021. Liver: Multiple nodules throughout the right and left lobes of the liver suggestive of metastatic disease. The largest lesion in the right lobe of liver posteriorly measures 4.6 x 4.3 cm in size and in the left lobe of the liver measures 4.1 x 6 cm in size.  These lesions are new since remote CT scan dated 24 September 2018, approximately unchanged since recent CT scan of intravenous contrast. Final report electronically signed by DR Narsa Michelle on 5/14/2021 2:43 PM    CT COMPARISON OF OUTSIDE FILMS    Result Date: 5/27/2021  Radiology exam is complete. No Radiologist dictation. Please follow up with ordering provider. MRI BRAIN W WO CONTRAST    Result Date: 5/29/2021  ** ADDENDUM #1 ** This report was discussed with Reny Mcfarland RN on May 29, 2021 18:39:00 EDT. This document has been electronically signed by: Laura Nobles on 05/29/2021 06:39 PM ** ORIGINAL REPORT ** MR brain with and without IV contrast. COMPARISON: CT of the head dated 5/29/2021 FINDINGS: Multiple T2/flair hyperintense T1 isointense, mildly diffusion restricting ring-enhancing lesions present within the cerebral hemispheres bilaterally. No significant surrounding edema. For example, Left occipital lobe ring-enhancing lesion measuring 7 mm (series 14, image 12). Left frontal lobe ring-enhancing lesion measuring 5 mm imprint series 14, image 18). Right frontal lobe ring-enhancing lesion measuring 4 mm (series 14, image 20) Right parietal lobe ring-enhancing lesion measuring 5 mm (series 14, image 18). Right temporal lobe ring-enhancing lesion measuring 3 mm (series 14, image 8). Additionally there are T1/T2 hyperintense nonenhancing lesions present within the right cerebellar hemisphere (measuring up to 2.8 cm), left temporal lobe (measuring up to 2.5 cm), right frontal lobe (measuring up to 9 mm) and right parietal lobe (measuring up to 7 mm). There is minimal gradient blooming within several of these lesions including the right parietal lobe and right cerebellar hemisphere suggesting these represent subacute hemorrhage. No significant surrounding edema. No extra-axial fluid collection. No hydrocephalus. Basilar cisterns are patent. 4th ventricle is patent. No brainstem abnormality identified. Intracranial flow voids are patent. The visualized paranasal sinuses and mastoid air-cells are clear.      1. Several well-contained areas of subacute intracranial hemorrhage lesions present both infratentorially and supratentorially, measuring up to 2.8 cm in the right cerebellar hemisphere. No surrounding edema. 2.  Multiple smaller ring-enhancing lesions present throughout the cerebral hemispheres bilaterally consistent with history of metastatic disease. No associated edema or midline shift. This document has been electronically signed by: Aki Lim MD on 05/29/2021 06:29 PM     XR COMPARISON OF OUTSIDE FILMS    Result Date: 5/27/2021  Radiology exam is complete. No Radiologist dictation. Please follow up with ordering provider. Assessment/Recommendations    1. Metastatic cancer from unknown origin -  CTA chest (-) PE, (+) R hilar adenopathy & lung metastases, osseous lytic metastases. CTA Abd/Pelvis (+) extensive hepatic metastases, ill-defined lesion R kidney, suspicious for malignancy; 3.5 cm infrarenal abd aortic aneurysm; osseous mets. CT head (+) multiple intra-axial lesions concerning for metastases, some with hemorrhagic componenet. MRI brain (+) several well-contained areas of subacute intracranial hemorrhage lesions present both infratentorially and supratentorially, measuring up to 2.8 cm in R cerebellar hemisphere-no surrounding edema. Multiple smaller ring-enhancing lesions present t/o cerebral hemispheres bilaterally consistent with Hx metastatic disease-no associated edema or midline shift. CT-guided liver biopsy cancelled d/t patient agitation/inability to hold still for procedure. Palliative care meeting with family, code status changed to [facility]. Poor prognosis, appropriate for palliative care/hospice. Pt transitioning to hospice care as inpt. 2.  Leukocytosis - WBC 23.7. Likely related to known malignancy. Afebrile. VSS. LA 1.4. COVID (-). Flu A/B (-). 3.  Macrocytic anemia - H/H 8.9/30.6. .3. Likely malignancy driven. Trend.   Transfuse for Hgb < 7 or active bleeding. Case discussed with nurse and patient/family. Questions and concerns addressed. Plan made in collaboration with Dr. Clemente Fowler.     Electronically signed by   NORMA Mehta CNP on 6/2/2021 at 1:34 PM

## 2021-06-02 NOTE — DISCHARGE SUMMARY
lesions present throughout the cerebral hemispheres bilaterally consistent with history of metastatic disease.  No associated edema or midline shift.   - on keppra for seizure prophylaxis and steroids  Ordered CT head w/o to follow up for possible increased ICP/midline shift; however, plan now hospice. pt will likely qualify for IP hospice care     Intractable pain secondary to #1  - planned IV morphine now given plan above     Acute hypoxic resp failure; DDX; PE, aspiration pneumonitis/PNA, vs others; comfort care only at this time     Dysphagia  NPO for now while awaiting SLP eval     Hypercalcemia (PTH independent)  - likely related to malignancy  - improved  - Continue IV hydration     Elevated LFTs w/ hyperALP and Hyperbilirubinemia c/w cholestatic etiology  - possibly secondary to metastatic liver lesions  - CT abd/plv shows Extensive hepatic metastases, 2 separate soft tissue nodules posterior to cava possibly lymphadenopathy or metastases, Ill-defined lesion right kidney seen on MRI as well, 3.5 cm infrarenal abdominal, aortic aneurysm. Osseous metastases. - Avoid hepatotoxic agents.       Leukocytosis  - reactive/ denationalization 2/2 CS vs others (aspiration PNA?). No other identifiable localized source of infection. Will start on zosyn for now     Acute Normo/macrocytic Anemia  -Etiology unclear; no clinically evident bleed. HD stable. No further W/U given planned hospice care    Abdominal Aortic Aneurysm  -CT abd/pelvis noted 3.5 cm infrarenal AAA  -Serial monitoring as outpatient     Essential Hypertension  Fairly controlled on home meds. Monitor     Hx of Hyperlipidemia  -Lipid panel 4/30/21: cholesterol 254/ HDL 43 /  / Triglycerides 146  -on statin which is held now due to transaminitis     GERD  -Continue PPI     Hypothyroidism  -Continue Synthroid; TSH from 4/30 WNLs.       Severe Malnutrition  -13 lb weight loss due to known malignancy  -Consult dietician for recommendations       Chief 31.5* 31.8* 30.6*    136 136     Recent Labs     05/31/21  0449 06/01/21  0438 06/02/21  0535    142 146*   K 4.6 4.4 3.9    109 112*   CO2 20* 21* 22*   BUN 37* 45* 41*   CREATININE 1.0 1.2 1.2   CALCIUM 10.6* 10.8* 10.5     Recent Labs     05/31/21  0449 06/02/21  0535   * 135*   ALT 50 65   BILIDIR 1.8* 2.3*   BILITOT 2.3* 2.7*   ALKPHOS 793* 771*     Recent Labs     06/02/21  0818   INR 1.21*     No results for input(s): CKTOTAL, TROPONINI in the last 72 hours. Microbiology:    Blood culture #1: No results found for: BC    Blood culture #2:No results found for: William Sinning    Organism:No results found for: ORG    No results found for: LABGRAM    MRSA culture only:No results found for: [facility]    Urine culture: No results found for: LABURIN    Respiratory culture: No results found for: CULTRESP    Aerobic and Anaerobic :  No results found for: LABAERO  No results found for: LABANAE    Urinalysis:    No results found for: Salvador Runge, BACTERIA, RBCUA, BLOODU, [address], GLUCOSEU    Radiology:  No orders to display     No results found. With RN in room, patient and town brother were updated about and agreed upon the treatment plan, all the questions and concerns were addressed.     Electronically signed by Ruby Segovia MD on 6/2/2021 at 5:00 PM

## 2021-06-02 NOTE — CARE COORDINATION
6/2/21, 11:41 AM EDT    DISCHARGE PLANNING EVALUATION    SW order received. SW was advised that hospice has been consulted for evaluation for inpt hospice. SW will remain available as needed.

## 2021-06-02 NOTE — CARE COORDINATION
6/2/21, 8:30 AM EDT    DISCHARGE ON GOING [address] day: 5  Location: -08/008-A Reason for admit: Shortness of breath [R06.02]   Procedure: none  Barriers to Discharge: Biopsy on hold for today due to patient not being able to lie still. Dr. Tyesha Hameed plans to have a discussion with family today to further clarify wishes. PCP: NORMA Jean Baptiste NP  Readmission Risk Score: 14%  Patient Goals/Plan/Treatment Preferences: Possible home with brother.

## 2021-06-02 NOTE — PROGRESS NOTES
Stephen Babb RN informed this RN that patient's brother was at bedside. Tien Murray CNP and this RN in to speak with brother. Tien Murray CNP updating brother, Adrian Conrad, and Davion's wife on patient's current status. Informed Adrian Conrad that patient was unable to go for liver biopsy safely due to not being able to follow commands. Tien Murray CNP informed family that Dr Caio Ruiz would be able to schedule patient for EBUS on Friday for lung biopsy, but that it would include needing to intubate patient. Tien Murray CNP educated family that if patient is intubated there is a possibly of patient not being able to be removed from ventilator. Davion's wife stating she had to go through this with a family member as well. Tien Murray CNP asked Adrian Conrad about patient's code status. Stating that if patient's heart were to stop patient would have chest compressions, shock his heart, and be intubated. Adrian Conrad stated he just wants patient comfortable. Davion very tearful, support given. Tien Murray stated with patient's current respiratory status patient may require BIPAP machince. This RN explained what a BIPAP is and that with patient's current confusion, patient may or may not tolerate BIPAP. Tien Murray CNP stated she will order ABGs to monitor patient's current status, Nancy GARCIA leaves to place ABG orders. This RN explains what information ABGs will provide. Stating that the results may inform doctors if patient would need a BIPAP. If patient did require BIPAP patient would need to be moved to a different floor. Davion's wife asked what would happen if patient is unable to tolerate BIPAP. This RN informed family that with patient's current code status, patient may require intubation. Adrian Conrad again stating he wants his brother comfortable. This RN informed family of options of DNR CC and a possible hospice consult to keep patient comfortable. Educated on medications incluiding ativan and morphine and how patient may respond to medications. Family stated they understood.  Adrian Conrad stated \"why am I the one who always have to carry this family's burden. I can't do this\". Davion's wife providing support. This RN stated we would be able to keep patient comfortable at this time, asked if family needed time to think, family agreed. 1120 Patient's family requested to speak with this RN. Stated that they have decided that they would want the patient to remain comfortable, change code status to DNR CC and consult hospice. This RN informed Dr Janny James. Support given to family.  Updated primary RN [facility]

## 2021-06-02 NOTE — PROGRESS NOTES
Patient still moaning and wincing in pain and very restless, patient nonverbal. Pain level by faces, at level of 8. RR > 30 with increased work of breathing on 6L. Morphine given per MAR.

## 2021-06-02 NOTE — PROGRESS NOTES
[facility]  PHYSICAL THERAPY MISSED TREATMENT NOTE  STRZ RENAL TELEMETRY 6K    Date: 2021  Patient Name: Franklin Koenig        MRN: [id number]   : 1965  (49 y.o.)  Gender: male                REASON FOR MISSED TREATMENT:  Hold treatment per nursing request.      Patient is not following commands and not appropriate for PT at this time. Pt has consult for hospice. Will try back tomorrow 6/3 and assess appropriateness.      Nguyen Rueda PT, DPT [id number]

## 2021-06-02 NOTE — PROGRESS NOTES
5652 Third attempt to call patient's brother, Ibeth Back, still no answer. 9910 On floor to see patient. Marco Schafer RN stated patient is on his way to CT for CT of head. Patient is on 6L NC 91%. Marco Schafer RN going with patient to CT to continue to monitor. Isabella Gale CNP on floor to see patient. Stated she spoke with patient's girlfriend, Denys Major, updated on patient status. Mcfarlane Show stated she would be coming in to see patient. 1000 Called and spoke with patient's mother, Ruby Giles. Updated her on patients current status. Informed her of patient's decline overnight. Including respiratory decline, increasing oxygen to 6L. Informed Ro of patient's current code status and what that would entail for patient. Ruby Giles stated she would rather this RN speak with patient's bother, Ibeth Back. This RN stated that Ibeth Back has been called several times this morning with no answer or call back. Asked Ruby Giles if there was another number back could be reached at besides [phone], Ro denied. This RN asked if Ruby Giles would have a number for Davion's wife, Ruby Giles stated it was the same number. This RN stated she would attempt to call Ibeth Back again. Ruby Giles denied further questions at this time.

## 2021-06-02 NOTE — PROGRESS NOTES
Radiation/ Oncology  Encounter Date: 6/2/2021 9:55 AM     0955:  Ankit Adams with Luis Pearson nurse, Georgina over the phone this morning. She states his condition has deteriorated over night. She reports him to be more restless and agitated, he is not opening his eyes or responding verbally. She also reports his pupils to be fixed at 2mm, having agonal breathing and has increased his oxygen from 1L NC to 6L NC. The liver biopsy was not able to be completed due to his restlessness. She reports they are currently headed to obtain a new CT of his head. She has called and updated his family and they are coming in to meet with palliative care this morning. If able will try to be present for this conversation. 1048: received a call from NII Mckay. Family is present and Hospitalist, Palliative Care are going in to meet with family. Due to clinic schedule was unable to leave for the meeting. We are in agreement with Hospice if this is the families wishes. We remain available for any questions that may arise.

## 2021-06-02 NOTE — PROGRESS NOTES
Patient is in bed moving around and becoming a high friction and sheer risk. Patient was restless agitated and in pain. Patient is constantly ripping off clothes and soiling himself. Patient is very confused claiming \"I have to go to work\". Patient has been given pain meds. This nurse consulted Sheree Childs for any meds that could lower restlessness. Orders were placed and followed.

## 2021-06-02 NOTE — PROGRESS NOTES
Pain Management    Progress Note      6/2/2021 2:36 PM     This patient is now on inpatient hospice care and is on a Morphine PCA. The pain management team will now sign off and defer to the hospice team for pain control. Please call if needed again or any questions.      Electronically signed by NORMA Urban CNP on 6/2/2021 at 2:38 PM

## 2021-06-02 NOTE — PROGRESS NOTES
Spoke with IR RN about patient, stated they would be unable to do biopsy due to restlessness and agitation along with safety issues, instructed to call back if anything changes.

## 2021-06-02 NOTE — PROGRESS NOTES
Patient resting peacefully in bed with morphine PCA at 2.5mg/hr, had to be increased at 1447 for CPOT of 5. Primary nurse Georgina RN reported that patient with noted visual discomfort/pain. Went to room and patient noted to be resting peacefully with no s/s of distress or discomfort during visit. Patient girlfriend of 5 yrs present at bedside. Tearful. Support given. Other family members had stepped out of room for awhile. Offered spiritual care and girlfriend voiced that she thought someone had been called already. Loved one and primary nurse denied needs.

## 2021-06-02 NOTE — CARE COORDINATION
Patient will be admitted to hospice.  Electronically signed by Joice Blizzard, RN on 6/2/2021 at 3:09 PM

## 2021-06-02 NOTE — PROGRESS NOTES
Patient restless in bed, moaning and wincing in pain, with increased work of breathing. Patient at this time able to state he is in pain but unable to state pain level or location. Morphine given per MAR.

## 2021-06-02 NOTE — PROGRESS NOTES
[facility]  OCCUPATIONAL THERAPY MISSED TREATMENT NOTE  STRZ RENAL TELEMETRY 6K  6K-008-A      Date: 2021  Patient Name: Nicanor Valentin        CSN: [id number]   : 1965  (49 y.o.)  Gender: male   Referring Practitioner: Katiana Evans MD  Diagnosis: Shortness of breath    REASON FOR MISSED TREATMENT: Hold Treatment per Nursing - Per Montrell Wiley RN, hold OT eval as patient is not following commands. Will reschedule OT eval for tomorrow 6/3 as appropriate.

## 2021-06-02 NOTE — PROGRESS NOTES
This staff met with pt and his twin Kartik Reno wife Damon Amaro, and his girlfriend of 5 years Faith Mcdowell for hospice admission and psychosocial assessment. Pt never  and has no children. Pt is one of four brothers - but the boy older and the boy younger than he and Amisha Solimaner have always not gotten along. With hippa constraints - it is asked that we not update brothers Nanci Yanes and Rosa Aldridge. Pts parents , , and remain alive. Pt and Amisha Ag were very close to their stepfather who  6 yeas ago. Pts mother Arun Ivy has poor short term memory. Pt has no adv dir, brother Amisha Ag is speaking for pt at this time with the support of their parents. Pt has not spoken since yesterday. Family tearful, diagnosis just a month old. Pt to move to 5K when appropriate. For clarification purposes twins are named Maria T Cannon and Regina Grimm. They go by Marilyn Esqueda and Amisha Ag. Hospitality cart in place, staff offered support and will remain available.

## 2021-06-02 NOTE — PROGRESS NOTES
Radiation/ Oncology  Encounter Date: 2021 10:02 AM    Mr. Ese Velazco is a 49 y.o. male  : 1965  MRN: [id number]  [facility]    Reason for request: Brain metastasis           HPI: Wei Galicia initially presented in late  with complaints of melena following several months of vague intermittent upper abdominal discomfort. He was evaluated in gastroenterology and underwent upper and lower endoscopy. Visually, diffuse gastritis was present; multiple biopsies were performed, showing focal intestinal metaplasia but no evidence of Helicobacter pylori, or malignancy. CT scan of the abdomen was performed due to persistent complaints of abdominal discomfort. This was read as showing evidence of metastatic disease in the liver. He was seen for medical oncology consultation, with recommendation for CT-guided biopsy and additional imaging studies. MRI scan of the abdomen redemonstrated the liver metastases, and also showed an abnormality in the right kidney suspicious for malignancy. Wei Galicia presented to the emergency department 2021 complaining of dyspnea. CT angiography did not show any evidence of pulmonary embolism, but did show evidence of lung metastases and thoracic lymphadenopathy; laboratory studies showed hypercalcemia. Wei Galicia was admitted to the hospital and the admission H&P was found to have some confusion. CT scan of the head showed evidence of multiple brain lesions some with evidence of subacute hemorrhage, later confirmed on MRI scan. Wei Galicia was seen by Dr. Sunday Sun on 21 for evaluation and discussion regarding the role of radiation therapy in the management of his intracranial metastatic disease. INTERVAL HISTORY: Currently Wei Galicia is laying in the bed with his eyes closed. He was easily aroused to his name. No family present currently. Wei Galicia is able to tell me his full name and birthday. He is not oriented to place.  He having difficulty with speech, which is garbled at times. He is able to follow commands when asked but not able to answer questions appropriately, most responses do not make sense.        ALLERGIES:   Allergies   Allergen Reactions    Aspirin      Rye syndrome          Current Facility-Administered Medications   Medication Dose Route Frequency Provider Last Rate Last Admin    folic acid (FOLVITE) tablet 1 mg  1 mg Oral Daily Leigh NORMA Peterson - CNP        senna (SENOKOT) tablet 17.2 mg  2 tablet Oral Nightly NORMA Bernardo - CNP        naloxegol (MOVANTIK) tablet 12.5 mg  12.5 mg Oral QAM Brenda Hauser APRN - CNP        lidocaine 4 % external patch 3 patch  3 patch Transdermal Daily BrendaNORMA Powers CNP   3 patch at 06/02/21 4215    morphine injection 4 mg  4 mg Intravenous Q2H PRN NORMA Bernardo CNP   4 mg at 06/02/21 0750    LORazepam (ATIVAN) injection 0.5 mg  0.5 mg Intravenous Q4H PRN Jovany Del Valle PA-C   0.5 mg at 06/02/21 8998    [Held by provider] oxyCODONE (ROXICODONE) immediate release tablet 5 mg  5 mg Oral Q4H PRN Theresa Hernandez MD        Or   Eliot Cartwright by provider] oxyCODONE (ROXICODONE) immediate release tablet 10 mg  10 mg Oral Q4H PRN Theresa Hernandez MD   10 mg at 05/31/21 2255    levETIRAcetam (KEPPRA) tablet 500 mg  500 mg Oral BID Theresa Hernandez MD   500 mg at 05/31/21 2121    dexamethasone (DECADRON) tablet 4 mg  4 mg Oral 3 times per day Theresa Hernandez MD   4 mg at 05/31/21 2121    docusate sodium (COLACE) capsule 100 mg  100 mg Oral BID Theresa Hernandez MD   100 mg at 05/31/21 2114    [Held by provider] morphine (MS CONTIN) extended release tablet 15 mg  15 mg Oral 2 times per day Shalom Blackmon MD   15 mg at 05/31/21 2114    sodium chloride flush 0.9 % injection 5-40 mL  5-40 mL Intravenous 2 times per day Marybeth Soto MD   10 mL at 05/30/21 2057    sodium chloride flush 0.9 % injection 5-40 mL  5-40 mL Intravenous PRN Marybeth Soto MD        0.9 % sodium chloride 06/01/2021    MCHC 29.2 06/01/2021     06/01/2021    MPV 10.8 06/01/2021     CMP:  Lab Results   Component Value Date     06/01/2021    K 4.4 06/01/2021     06/01/2021    CO2 21 06/01/2021    BUN 45 06/01/2021    CREATININE 1.2 06/01/2021    LABGLOM 63 06/01/2021    GLUCOSE 129 06/01/2021     Onc labs:   Lab Results   Component Value Date    CEA 6.9 05/06/2021       Pathology: Liver biopsy planned for tomorrow (6/2/21)    RADIOLOGY RESULTS:   5/26/21: MRI brain:   Impression   1.  Several well-contained areas of subacute intracranial hemorrhage    lesions present both infratentorially and supratentorially, measuring up    to 2.8 cm in the right cerebellar hemisphere.  No surrounding edema. 2.  Multiple smaller ring-enhancing lesions present throughout the    cerebral hemispheres bilaterally consistent with history of metastatic    disease.  No associated edema or midline shift. ROS: As noted in the HPI, otherwise negative. PHYSICAL EXAMINATION:   VITAL SIGNS: BP (!) 141/65   Pulse 107   Temp 97.4 °F (36.3 °C) (Axillary)   Resp 24   Ht 5' 9\" (1.753 m) Comment: per old EMR chart  Wt 201 lb 11.2 oz (91.5 kg)   SpO2 93%   BMI 29.79 kg/m²   ECOG PERFORMANCE STATUS: 2    GENERAL: Coletta Ahumada is chronically ill appearing adult male. Oriented to name and birthday only. No acute distress noted. Cooperative with assessment. SKIN: Warm, dry intact. HEENT: Normocephalic, atraumatic. PERRL. EOM delayed, without smooth movement. Ears, nose and mouth are within normal limtis on external examination. LUNGS: Diminished bases, scattered crackles throughout. Respirations slightly labored. On 1LNC with oxygen sats 95%  CARDIAC: Regular rate and rhythm without murmur. ABDOMINAL: Soft, non tender, non distended. Active bowel sounds x4. MUSCULOSKELETAL: Without clubbing or cyanosis. No edema noted. NEUROLOGIC: Alert to name. Oriented to only name and birthday. Garbled speech at times. Follows commands appropriately. ASSESSMENT:  Mo Au is a 48 yo male with widely metastatic malignancy with unknown origin, also with intracranial disease. Radiation oncology was asked to see for the intracranial disease. He does not yet have confirmed tissue diagnosis. Liver biopsy is scheduled for 6/2/21. He has had a decline in his neurological status since he was seen by Dr. Hoyos Every on 5/30/21. There appears to be hemorrhage around the brain lesions which are contributing to the neurological decline. PLAN:  Met with Mo Au, his mother, twin brother, Sharon Garcia and Palliative care was present. We discussed at length with them the extent of the disease and that we wont know for certain where it originated until a biopsy is obtained (possibly kidney primary). If this is a kidney primary they can be less responsive to the radiation treatment, therefore not giving the desirable outcome of decreasing the size of the lesions and improving his symptoms. They are aware due to the bleeding around the lesions his continue could continue to worsen. Medical oncology recommendation is systemic treatment once tissue diagnosis is confirmed. The recommendation for brain metastasis is for palliative whole brain radiation therapy (this can be started prior to tissue diagnosis as Mo Au is symptomatic and his condition has worsened over the last couple of days and the result of the biopsy will not change our treatment course). Discussed the reasoning for the whole brain radiation, The nature/mode of action of external beam radiation therapy, steps involved and set up, initiation and performance of the treatment (including simulation and verification) and logistics of treatment were reviewed. Expected and potential side effects and risks of the treatment (both short and long-term) were discussed in detail. They had the opportunity to ask questions, and indicated that their questions were satisfactorily addressed.  The Family would like to discuss proceeding with whole brain radiation prior to proceeding. They are agreeable to proceed with liver biopsy tomorrow to confirm diagnosis. Much support was given to the family. Updated patients Rosenda BALES Expose and will check in tomorrow to find out families decision with the radiation.            Electronically signed by NORMA Garza CNP on 6/2/2021 at 10:02 AM

## 2021-06-02 NOTE — PROGRESS NOTES
Liver biopsy unable to safely be done this morning. Per pt's nurse, pt has altered mental status and unable to lie still for procedure.

## 2021-06-02 NOTE — PROGRESS NOTES
GERD (gastroesophageal reflux disease)     Hyperlipidemia     Hypertension     Thyroid disease       Past Surgical History           Procedure Laterality Date    CHOLECYSTECTOMY      COLONOSCOPY      COLONOSCOPY  4/2/2021    COLONOSCOPY WITH BIOPSY performed by Jason Martins MD at [facility] [place] [facility]    COLONOSCOPY  4/2/2021    COLONOSCOPY POLYPECTOMY SNARE/COLD BIOPSY performed by Jason Martins MD at [facility] [place] [facility]    ENDOSCOPY, COLON, DIAGNOSTIC      FRACTURE SURGERY Right     foot    TONSILLECTOMY      UPPER GASTROINTESTINAL ENDOSCOPY N/A 1/22/2021    EGD BIOPSY performed by Jason Martins MD at [facility] [place] [facility]     Diet    Diet NPO Effective Now Exceptions are: Other (Specify); Specify Other: no exceptions/ aspiration risk  Allergies    Aspirin  Social History     Social History     Socioeconomic History    Marital status:      Spouse name: Not on file    Number of children: Not on file    Years of education: Not on file    Highest education level: Not on file   Occupational History    Not on file   Tobacco Use    Smoking status: Current Every Day Smoker     Packs/day: 1.00     Years: 30.00     Pack years: 30.00    Smokeless tobacco: Never Used   Substance and Sexual Activity    Alcohol use: Yes     Comment: 3-4 times/ week beer    Drug use: Never    Sexual activity: Not on file   Other Topics Concern    Not on file   Social History Narrative    Not on file     Social Determinants of Health     Financial Resource Strain:     Difficulty of Paying Living Expenses:    Food Insecurity:     Worried About Running Out of Food in the Last Year:     Ran Out of Food in the Last Year:    Transportation Needs:     Lack of Transportation (Medical):      Lack of Transportation (Non-Medical):    Physical Activity:     Days of Exercise per Week:     Minutes of Exercise per Session:    Stress:     Feeling of Stress :    Social Connections:     Frequency of Communication with Friends and Family:     Frequency of Social Gatherings with Friends and Family:     Attends Restoration[employer] Services:     Active Member of Clubs or Organizations:     Attends Club or Organization Meetings:     Marital Status:    Intimate Partner Violence:     Fear of Current or Ex-Partner:     Emotionally Abused:     Physically Abused:     Sexually Abused:      Family History    History reviewed. No pertinent family history. Sleep History    No history   Meds    Current Medications    folic acid  1 mg Oral Daily    senna  2 tablet Oral Nightly    naloxegol  12.5 mg Oral QAM    lidocaine  3 patch Transdermal Daily    levETIRAcetam  500 mg Oral BID    dexamethasone  4 mg Oral 3 times per day    docusate sodium  100 mg Oral BID    [Held by provider] morphine  15 mg Oral 2 times per day    sodium chloride flush  5-40 mL Intravenous 2 times per day    levothyroxine  125 mcg Oral Daily    [Held by provider] atorvastatin  10 mg Oral Daily    metoprolol tartrate  25 mg Oral BID    pantoprazole  40 mg Oral QAM AC     morphine, LORazepam, [Held by provider] oxyCODONE **OR** [Held by provider] oxyCODONE, sodium chloride flush, sodium chloride, promethazine **OR** ondansetron, polyethylene glycol, acetaminophen **OR** acetaminophen, albuterol  IV Drips/Infusions   sodium chloride      sodium chloride 150 mL/hr at 06/02/21 0616     Vitals    Vitals    height is 5' 9\" (1.753 m) and weight is 201 lb 11.2 oz (91.5 kg). His axillary temperature is 97.4 °F (36.3 °C). His blood pressure is 146/66 (abnormal) and his pulse is 110. His respiration is 24 and oxygen saturation is 90%.      O2 Flow Rate (L/min): 6 L/min  I/O    Intake/Output Summary (Last 24 hours) at 6/2/2021 0912  Last data filed at 6/2/2021 0317  Gross per 24 hour   Intake 2497.11 ml   Output 675 ml   Net 1822.11 ml     Patient Vitals for the past 96 hrs (Last 3 readings):   Weight   06/02/21 0317 201 lb 11.2 oz (91.5 kg)   06/01/21 0330 203 lb 1.6 oz (92.1 kg) Asiya Mejia MD (Interpreting   physician) on 05/30/2021 at 08:34 AM    Cultures    Procalcitonin  No results found for: [facility]    Radiology    CT Scans          Assessment   -Metastatic carcinoma, suspect lung origin-is currently waiting for CT-guided biopsy of the liver  -Multiple smaller ring-enhancing lesions present throughout the cerebral hemispheres bilaterally consistent with history of metastatic disease.  -Chronic history of tobacco smoking for 30 years-high suspicion for COPD  -Hypercalcemia secondary to bone mets-improving  -Elevated liver function tests-due to metastatic lesion  -Anemia-stable hemoglobin and hematocrit  -Hypothyroidism on supplementation  -Full Code   Recommendations   -No plans for biopsy right now d/t sudden decline in mentation and overall health  -CT Head ordered per primary to r/o shift and increasing ICP due to pupils restricted and NR and c/o headache   -Wean FiO2 keep saturations more than 90%  -Continue albuterol 2.5 mg by nebulization every 6 hourly as needed bronchodilators prn  -DVT prophylaxis per primary-anticoagulation is on hold due to abnormal MRI scan of the brain with subacute intracranial hemorrhage lesions present both infratentorially and supratentorially   -Palliative care to see again- CODE status needs addressed  -I spoke with girlfriend Sarah Boyer I was unable to get a hold of anyone else. Girlfriend is to get a hold of family members and come to hospital.  -No plans for EBUS currently       Electronically signed by   NORMA Alicea CNP on 6/2/2021 at 9:12 AM    Addendum by Dr. Eulalia Adams MD:  I have seen and examined the patient independently. Face to face evaluation and examination was performed. The above evaluation and note has been reviewed. Labs and radiographs were reviewed. I Have discussed with JEAN PAUL Lang CNP about this patient in detail. The above assessment and plan has been reviewed.  Please see my modifications mentioned below.     My modifications:  He is on 3 L of oxygen via nasal  CODE STATUS was changed to DNR CC.  -Will sign off on the case from pulmonary point of view. -Will follow PRN. -Call [phone] with questions.     Tera Allison MD 6/2/2021 6:45 PM

## 2021-06-03 NOTE — PROGRESS NOTES
Post mortem care was provided by This RN. This RN let House Supervisor Elba RN know post mortem care was complete. House Supervisor Elba RN took patient down to [facility].

## 2021-06-03 NOTE — PROGRESS NOTES
[address]  Notice of Patient Passing      Patient Name- Yudith Lazaro [facility]   Acct Number- [de-identified]   Attending Physician- Kathy Arambula MD    Admitted on-6/2/2021  3:22 PM     On 6/2/2021 at 2028 patient was found in [facility] with:   Absence of vital signs. Absence of neurological response. Confirmed time of death at 2028. Physician or On-call Physician notified of time of death- yes    Family present at time of death- no   Spiritual care present at time of death- no    Physician was notified and orders were obtained to release the body. Post-Mortem documentation completed; form printed, signed, and given to admitting.     Yasmine Parham RN RN Nursing Supervisor/ Manager  6/2/21   9:02 PM

## 2021-06-03 NOTE — PROGRESS NOTES
Got pt's brother Davion's phone number from [employer]. Attempted to Call [employer] Marrero pt's brother and [facility] nurse back about not being able to get ahold of the brother. Jenny RN is not going to try [employer] Wife Kieran Neumann.    [phone] [facility] RN got [place] of Davion's Wife Kieran Neumann and is unsure if they are going to come back to the hospital.

## 2021-06-07 NOTE — DISCHARGE SUMMARY
Pt passed away peacefully at 20:28 on 6/2/2021 secondary to acute hypoxic/hypercapneic respiratory failure d/t aspiration PNA and increased ICP pressure 2/2 metastatic cancer of unknown primary with mets to the brain.

## 2021-06-13 LAB — PTH RELATED PEPTIDE: 8 PMOL/L (ref 0–2.3)

## 2024-12-30 NOTE — ED NOTES
Bed: 006A  Expected date: 5/28/21  Expected time:   Means of arrival:   Comments:  Endo patient     Maico Faye RN  05/28/21 7757 Patient is scheduled.

## (undated) DEVICE — GLOVE ORTHO 8   MSG9480

## (undated) DEVICE — BIOGUARD A/W CLEANING ADAPTER